# Patient Record
Sex: FEMALE | Race: WHITE | NOT HISPANIC OR LATINO | Employment: OTHER | URBAN - METROPOLITAN AREA
[De-identification: names, ages, dates, MRNs, and addresses within clinical notes are randomized per-mention and may not be internally consistent; named-entity substitution may affect disease eponyms.]

---

## 2019-02-18 ENCOUNTER — HOSPITAL ENCOUNTER (OUTPATIENT)
Dept: OTHER | Facility: HOSPITAL | Age: 49
Discharge: HOME | End: 2019-02-18
Attending: NURSE PRACTITIONER

## 2019-02-26 ENCOUNTER — HOSPITAL ENCOUNTER (OUTPATIENT)
Dept: OTHER | Facility: HOSPITAL | Age: 49
Discharge: HOME | End: 2019-02-26
Attending: NURSE PRACTITIONER

## 2019-03-27 ENCOUNTER — HOSPITAL ENCOUNTER (OUTPATIENT)
Dept: OTHER | Facility: HOSPITAL | Age: 49
Discharge: HOME | End: 2019-03-27
Attending: NURSE PRACTITIONER

## 2019-03-27 LAB
ALBUMIN SERPL-MCNC: 4.3 G/DL (ref 3.5–5)
ALBUMIN/GLOB SERPL: 1.4 {RATIO} (ref 1.4–2.6)
ALP SERPL-CCNC: 80 U/L (ref 42–98)
ALT SERPL-CCNC: 15 U/L (ref 10–40)
ANION GAP SERPL CALC-SCNC: 17 MMOL/L (ref 8–19)
AST SERPL-CCNC: 15 U/L (ref 15–50)
BASOPHILS # BLD AUTO: 0.06 10*3/UL (ref 0–0.2)
BASOPHILS NFR BLD AUTO: 1 % (ref 0–3)
BILIRUB SERPL-MCNC: 0.23 MG/DL (ref 0.2–1.3)
BUN SERPL-MCNC: 13 MG/DL (ref 5–25)
BUN/CREAT SERPL: 17 {RATIO} (ref 6–20)
CALCIUM SERPL-MCNC: 9.1 MG/DL (ref 8.7–10.4)
CHLORIDE SERPL-SCNC: 102 MMOL/L (ref 99–111)
CHOLEST SERPL-MCNC: 135 MG/DL (ref 107–200)
CHOLEST/HDLC SERPL: 2.5 {RATIO} (ref 3–6)
CONV ABS IMM GRAN: 0.01 10*3/UL (ref 0–0.2)
CONV CO2: 25 MMOL/L (ref 22–32)
CONV IMMATURE GRAN: 0.2 % (ref 0–1.8)
CONV TOTAL PROTEIN: 7.4 G/DL (ref 6.3–8.2)
CREAT UR-MCNC: 0.77 MG/DL (ref 0.5–0.9)
CRP SERPL-MCNC: 2 MG/L (ref 0–5)
DEPRECATED RDW RBC AUTO: 45.1 FL (ref 36.4–46.3)
EOSINOPHIL # BLD AUTO: 0.14 10*3/UL (ref 0–0.7)
EOSINOPHIL # BLD AUTO: 2.3 % (ref 0–7)
ERYTHROCYTE [DISTWIDTH] IN BLOOD BY AUTOMATED COUNT: 13.8 % (ref 11.7–14.4)
ERYTHROCYTE [SEDIMENTATION RATE] IN BLOOD: 3 MM/H (ref 0–20)
EST. AVERAGE GLUCOSE BLD GHB EST-MCNC: 100 MG/DL
FSH SERPL-ACNC: 10.3 M[IU]/ML
GFR SERPLBLD BASED ON 1.73 SQ M-ARVRAT: >60 ML/MIN/{1.73_M2}
GLOBULIN UR ELPH-MCNC: 3.1 G/DL (ref 2–3.5)
GLUCOSE SERPL-MCNC: 90 MG/DL (ref 65–99)
HBA1C MFR BLD: 13.5 G/DL (ref 12–16)
HBA1C MFR BLD: 5.1 % (ref 3.5–5.7)
HCT VFR BLD AUTO: 42.8 % (ref 37–47)
HDLC SERPL-MCNC: 55 MG/DL (ref 40–60)
LDLC SERPL CALC-MCNC: 58 MG/DL (ref 70–100)
LH SERPL-ACNC: 5.6 M[IU]/ML
LYMPHOCYTES # BLD AUTO: 1.72 10*3/UL (ref 1–5)
MCH RBC QN AUTO: 28 PG (ref 27–31)
MCHC RBC AUTO-ENTMCNC: 31.5 G/DL (ref 33–37)
MCV RBC AUTO: 88.8 FL (ref 81–99)
MONOCYTES # BLD AUTO: 0.52 10*3/UL (ref 0.2–1.2)
MONOCYTES NFR BLD AUTO: 8.7 % (ref 3–10)
NEUTROPHILS # BLD AUTO: 3.54 10*3/UL (ref 2–8)
NEUTROPHILS NFR BLD AUTO: 59.1 % (ref 30–85)
NRBC CBCN: 0 % (ref 0–0.7)
OSMOLALITY SERPL CALC.SUM OF ELEC: 290 MOSM/KG (ref 273–304)
PLATELET # BLD AUTO: 270 10*3/UL (ref 130–400)
PMV BLD AUTO: 10.6 FL (ref 9.4–12.3)
POTASSIUM SERPL-SCNC: 4.1 MMOL/L (ref 3.5–5.3)
RBC # BLD AUTO: 4.82 10*6/UL (ref 4.2–5.4)
SODIUM SERPL-SCNC: 140 MMOL/L (ref 135–147)
T4 FREE SERPL-MCNC: 1.2 NG/DL (ref 0.9–1.8)
TRIGL SERPL-MCNC: 108 MG/DL (ref 40–150)
TSH SERPL-ACNC: 2.05 M[IU]/L (ref 0.27–4.2)
VARIANT LYMPHS NFR BLD MANUAL: 28.7 % (ref 20–45)
VIT B12 SERPL-MCNC: 658 PG/ML (ref 211–911)
VLDLC SERPL-MCNC: 22 MG/DL (ref 5–37)
WBC # BLD AUTO: 5.99 10*3/UL (ref 4.8–10.8)

## 2019-03-28 LAB
25(OH)D3 SERPL-MCNC: 31.7 NG/ML (ref 30–100)
FOLATE SERPL-MCNC: 4.7 NG/ML (ref 4.8–20)

## 2019-08-30 ENCOUNTER — HOSPITAL ENCOUNTER (OUTPATIENT)
Dept: OTHER | Facility: HOSPITAL | Age: 49
Discharge: HOME | End: 2019-08-30
Attending: NURSE PRACTITIONER

## 2019-09-27 ENCOUNTER — TELEPHONE (OUTPATIENT)
Dept: SURGERY | Facility: CLINIC | Age: 49
End: 2019-09-27

## 2019-09-27 NOTE — TELEPHONE ENCOUNTER
New patient appointment with Dr. Gasca is scheduled on 10/7/2019 @ 10:00am.    Called and spoke to patient, patient expressed verbal understanding of appointment time.    Sent patient a reminder letter in the mail.

## 2019-10-07 ENCOUNTER — OFFICE VISIT (OUTPATIENT)
Dept: SURGERY | Facility: CLINIC | Age: 49
End: 2019-10-07

## 2019-10-07 VITALS
HEIGHT: 64 IN | DIASTOLIC BLOOD PRESSURE: 78 MMHG | HEART RATE: 88 BPM | OXYGEN SATURATION: 98 % | SYSTOLIC BLOOD PRESSURE: 102 MMHG | BODY MASS INDEX: 29.37 KG/M2 | WEIGHT: 172 LBS

## 2019-10-07 DIAGNOSIS — D24.2 BREAST FIBROADENOMA IN FEMALE, LEFT: Primary | ICD-10-CM

## 2019-10-07 PROCEDURE — 99205 OFFICE O/P NEW HI 60 MIN: CPT | Performed by: SURGERY

## 2019-10-07 RX ORDER — TIZANIDINE 4 MG/1
4 TABLET ORAL NIGHTLY
Refills: 0 | COMMUNITY
Start: 2019-10-01

## 2019-10-07 RX ORDER — HYDROXYCHLOROQUINE SULFATE 200 MG/1
200 TABLET, FILM COATED ORAL DAILY
COMMUNITY
Start: 2017-03-07 | End: 2020-09-02

## 2019-10-07 RX ORDER — SERTRALINE HYDROCHLORIDE 100 MG/1
150 TABLET, FILM COATED ORAL DAILY
COMMUNITY
Start: 2017-02-03 | End: 2020-05-28

## 2019-10-07 RX ORDER — PHENTERMINE HYDROCHLORIDE 37.5 MG/1
37.5 TABLET ORAL DAILY
Refills: 0 | COMMUNITY
Start: 2019-09-09 | End: 2020-05-28

## 2019-10-07 RX ORDER — PROPRANOLOL HYDROCHLORIDE 80 MG/1
CAPSULE, EXTENDED RELEASE ORAL
COMMUNITY
Start: 2017-06-20 | End: 2020-05-28

## 2019-10-07 NOTE — PROGRESS NOTES
[facility]     Referring Provider: FAB Ryan     Chief complaint: Left breast fibroadenoma     HPI: Ms. Sarika Nugent is a 49 yo woman, seen at the request of FAB Ryan, for a new diagnosis of left breast fibroadenoma. This was initially detected as an imaging abnormality on routine screening on 19. She underwent diagnostic imaging, however there was no ultrasound correlate for the 2.9 cm mass seen on mammogram. She subsequently underwent a stereotactic biopsy, which showed a fibroadenoma (I sent her biopsy pathology to Dr. Feng Rabago for a second opinion). Her work-up is detailed in the breast history section below. Prior to the biopsy, she denies any breast lumps, pain, skin changes, or nipple discharge.    She has a past history of a left breast ultrasound-guided biopsy in  which showed fibroadenomatoid change. She has a family history of breast cancer in her maternal grandmother (diagnosed in her 50s). She denies any family history of ovarian cancer. She was by herself in clinic today.       I personally reviewed her records and summarized her relevant breast history/imagin/6/13, Screening MMG ([place]):   Scattered fibroglandular densities. There is a round mass seen in the anterior depth region of the left breast at 3:00. There are no masses, suspicious calcifications, or other abnormalities seen in the right breast.   BI-RADS 0: Incomplete.    13, Left Diagnostic MMG & US ([facility]):   MMG:  An oval mass persisted on spot compression in the anterior depth lateral left breast.   US:  At the 3:00 position 3 cm from the nipple a 1.4 cm maximal diameter oval mildly heterogeneously hypoechoic parallel mass was identified which has benign imaging characteristics and likely represents a fibroadenoma. The mass is retrospectively palpable on targeted physical exam performed by myself. The finding correlates nicely with the mammogram. The patient prefers ultrasound guided  core needle biopsy.   BI-RADS 4A: Suspicious.    9/26/13, Left Breast, US-Guided Biopsy ([place]):   Left Breast Mass at 3:00, Core Needle Biopsy:   1. Benign Breast Tissue with Fibroadenomatoid Change, Apocrine Metaplasia, and Stromal Fibrosis.   2. No Atypia or Malignancy Identified.  -Concordant.    1/5/15, Screening MMG ([facility]):   Scattered fibroglandular densities. There is a biopsy marker associated with a faint nodular density in the upper outer hemisphere left breast, middle third depth. There is no new dominant nodule, mass or suspicious cluster of microcalcifications.  BI-RADS 2: Benign.    4/20/16, Screening MMG ([facility]):   Biopsy marker associated with a biopsy-proven fibroadenoma in the left breast is again noted. Breast parenchyma is composed of scattered fibroglandular densities. The pattern is unchanged. There is no new dominant nodule, mass, or suspicious cluster of microcalcifications.   BI-RADS 2: Benign.    8/30/19, Screening MMG with Tomosynthesis ([facility]):  Scattered areas of fibroglandular density.  No suspicious mass, area of architectural distortion or suspicious microcalcification is identified in the right breast. Stable 1 cm isodense nodule in the anterior outer left breast with associated biopsy clip. New or enlarged 2.5 cm isodense nodule in the mid depth slightly outer left breast. No concerning microcalcification or areas of architectural distortion in the left breast.   BI-RADS 0: Incomplete.     9/6/19, Left Diagnostic MMG & Left Breast US ([facility]):   MMG:  There is a well-circumscribed round, partially obscured mass in the superior left breast at the 12:00-1:00 position. This measures 2.9 x 2.4 x 2.7 cm. There no microcalcifications or architectural distortion. The lesion is positioned approximately 7 cm posterior to the nipple. Is been previous biopsy in the lateral left breast.   US:  No suspicious ultrasound findings are identified. No mass  or evidence of malignancy. An ultrasound correlates for the rounded mass in the left breast is not clearly identified.   IMPRESSION:   Incomplete mammogram. There is a 2.7 cm well-circumscribed mass in the left breast, presumably at the 12:00 to 1:00 position. It is unusual that no ultrasound correlates is identified. I would recommend a 3-D mammogram to confirm that the mass is truly present in this is not artifact from overlapping fibroglandular tissue. If this remains on the 3-D images, a stereotactic biopsy would be recommended.   BI-RADS 0: Incomplete.    9/10/19, Left Breast, Stereotactic Biopsy ([facility]):   1. Left Breast Tissue, Core Biopsies  Segments of Breast Tissue Containing Focal Sclerosing Adenosis, Very Focal Hyperplasia, Usual Type, and Segments of a Fibroglandular Lesion with Prominent Fibrosis.   Comment: The fibrous component has a locally prominent spindled/ fascicular pattern. Different diagnosis would include a fibroadenoma, as well as a myoepithelioma.   -Concordant.  Dr. Feng Rabago, Pathology Consultation ([facility]):   Breast, Left Core Needle Biopsy (SP-, 3 Slides):   - Fibroadenoma; Usual Hyperplasia without Atypia.  Comment: I essentially agree with the original pathologist's diagnosis. The fibroadenoma has myofibroblastic stroma, a finding of no clinical significance.      Review of Systems   Constitutional: Negative for appetite change, chills, diaphoresis, fatigue, fever and unexpected weight change.   HENT:   Negative for hearing loss, lump/mass, mouth sores, nosebleeds, sore throat, tinnitus, trouble swallowing and voice change.    Eyes: Negative for eye problems and icterus.   Respiratory: Negative for chest tightness, cough, hemoptysis, shortness of breath and wheezing.    Cardiovascular: Negative for chest pain, leg swelling and palpitations.   Gastrointestinal: Negative for abdominal distention, abdominal pain, blood in stool, constipation,  diarrhea, nausea, rectal pain and vomiting.   Endocrine: Negative for hot flashes.   Genitourinary: Negative for bladder incontinence, difficulty urinating, dyspareunia, dysuria, frequency, hematuria, menstrual problem, nocturia, pelvic pain, vaginal bleeding and vaginal discharge.    Musculoskeletal: Positive for arthralgias. Negative for back pain, flank pain, gait problem, myalgias, neck pain and neck stiffness.        Due to Lupus.   Skin: Negative for itching, rash and wound.   Neurological: Negative for dizziness, extremity weakness, gait problem, headaches, light-headedness, numbness, seizures and speech difficulty.   Hematological: Negative for adenopathy. Does not bruise/bleed easily.   Psychiatric/Behavioral: Positive for depression and sleep disturbance. Negative for confusion, decreased concentration and suicidal ideas. The patient is nervous/anxious.         Chronic, stable on medication.   I personally reviewed and updated the ROS.      Medications:    Current Outpatient Medications:   •  hydroxychloroquine (PLAQUENIL) 200 MG tablet, Take 200 mg by mouth Daily., Disp: , Rfl:   •  propranolol LA (INDERAL LA) 80 MG 24 hr capsule, , Disp: , Rfl:   •  sertraline (ZOLOFT) 100 MG tablet, Take 150 mg by mouth Daily., Disp: , Rfl:   •  topiramate (TOPAMAX) 200 MG tablet, , Disp: , Rfl:   •  linaclotide (LINZESS) 145 MCG capsule capsule, Take 145 mcg by mouth Daily., Disp: , Rfl:   •  Multiple Vitamins-Minerals (MULTIVITAL) tablet, Take 1 tablet by mouth Daily., Disp: , Rfl:   •  phentermine (ADIPEX-P) 37.5 MG tablet, Take 37.5 mg by mouth Daily., Disp: , Rfl: 0  •  tiZANidine (ZANAFLEX) 4 MG tablet, Take 4 mg by mouth 3 (Three) Times a Day As Needed., Disp: , Rfl: 0      Allergies   Allergen Reactions   • Clindamycin Rash and Swelling   • Erythromycin Other (See Comments) and Rash     INTERNAL Bleeding   • Levofloxacin Rash and Swelling   • Penicillins Rash and Swelling     PT REPORTS CAN TAKE KEFLEX/KEFZOL  WITHOUT ANY ISSUES.   • Sulfamethoxazole-Trimethoprim Rash and Swelling       Past Medical History:   Diagnosis Date   • Lupus (CMS/HCC)        Past Surgical History:   Procedure Laterality Date   • KNEE SURGERY     • RHINOPLASTY     • TUBAL ABDOMINAL LIGATION     • WRIST FRACTURE SURGERY         Family History   Problem Relation Age of Onset   • Breast cancer Maternal Grandmother         in her 50's   • Brain cancer Maternal Aunt         in her 50's   • Cancer Maternal Cousin         unsure of what kind in her late 40's   • Cervical cancer Maternal Aunt         late 30's/early 40's       Social History     Socioeconomic History   • Marital status:      Spouse name: Not on file   • Number of children: 4   • Years of education: Not on file   • Highest education level: Not on file   Occupational History   • Occupation: Disabled   Tobacco Use   • Smoking status: Never Smoker   • Smokeless tobacco: Never Used   Substance and Sexual Activity   • Alcohol use: No     Frequency: Never   • Drug use: No   Social History Narrative    1 daughter, 3 sons     Patient drinks 1 servings of caffeine per day.       GYNECOLOGIC HISTORY:   . P: 4. AB: 0.  Last menstrual period: 21  Age at menarche: 16  Age at first childbirth: 20  Lactation/How long: no  Age at menopause: premenopausal  Total years of oral contraceptive use: none  Total years of hormone replacement therapy: none      Physical Exam  Vitals:    10/07/19 1008   BP: 102/78   Pulse: 88   SpO2: 98%     ECOG 0 - Asymptomatic  General: NAD, well appearing  Psych: a&o x 3, normal mood and affect  Eyes: EOMI, no scleral icterus  ENMT: neck supple without masses or thyromegaly, mucus membranes moist  Resp: normal effort, CTAB  CV: RRR, no murmurs, no edema  GI: soft, NT, ND  MSK: normal gait, normal ROM in bilateral shoulders  Lymph nodes: no cervical, supraclavicular or axillary lymphadenopathy  Breast: symmetric, large size, grade 1 ptosis, dense tissue  "bilaterally  Right: No visible abnormalities on inspection while seated, with arms raised or hands on hips. No masses, skin changes, or nipple abnormalities.  Left: No visible abnormalities on inspection while seated, with arms raised or hands on hips. No masses, skin changes, or nipple abnormalities.    Left breast, in-office ultrasound: At 2:00, partially retroareolar, there is an oval, well-circumscribed, hypoechoic mass with internal biopsy clip visualized. This is surrounded by dense stromal tissue, which is probably why it is not easily palpable.       I have independently reviewed her imaging and here are my findings:   In the left retroareolar breast, there is a ~3 cm well-circumscribed mass with an \"hourglass-shaped\" clip, consistent with the biopsy-proven fibroadenoma. There is an \"open lock-shaped\" clip in the left breast at 3:00, from the prior benign biopsy in 2013.      Assessment:  48 y.o. F with a new diagnosis of left breast fibroadenoma.    Discussion:  We discussed the diagnosis of fibroadenoma. I reviewed its benign nature and usually benign natural history, often regressing with time. I reviewed with her the low risk of malignant degeneration. I reviewed indications for excision including symptoms (such as pain or cosmesis), large size at baseline, or interval increase in size on imaging or exam. She currently has no indications for excision, however if the mass enlarges, we may consider excision.    I do however recommend short interval follow-up imaging to document stability of the lesion. In addition I recommend a repeat ultrasound, since the mass was not initially identified on the diagnostic ultrasound done at [facility] and I do believe that there is an ultrasound correlate.    Plan:  -F/u in 3/2020 with left MMG & US.   -She was instructed to call sooner with any questions, concerns or changes on BSE.    Letty Gasca MD      CC:  FAB Ryan  "

## 2019-10-14 ENCOUNTER — TELEPHONE (OUTPATIENT)
Dept: SURGERY | Facility: CLINIC | Age: 49
End: 2019-10-14

## 2019-10-14 NOTE — TELEPHONE ENCOUNTER
MMG & US are scheduled on 3/9/2020 @ 12:00pm.    F/u appointment with Dr. Gasca is scheduled on 3/18/2020 @ 10:00am.    Called and spoke to patient, patient expressed verbal understanding of appointment time.    Sent patient a reminder letter in the mail.

## 2020-03-02 ENCOUNTER — HOSPITAL ENCOUNTER (OUTPATIENT)
Dept: GENERAL RADIOLOGY | Facility: HOSPITAL | Age: 50
Discharge: HOME | End: 2020-03-02
Attending: NURSE PRACTITIONER

## 2020-03-09 ENCOUNTER — HOSPITAL ENCOUNTER (OUTPATIENT)
Dept: MAMMOGRAPHY | Facility: HOSPITAL | Age: 50
Discharge: HOME | End: 2020-03-09

## 2020-03-16 ENCOUNTER — TELEPHONE (OUTPATIENT)
Dept: SURGERY | Facility: CLINIC | Age: 50
End: 2020-03-16

## 2020-03-16 NOTE — TELEPHONE ENCOUNTER
Due to COVID-19 epidemic, patient's appointment with Dr. Gasca on 3/18/2020 has been cancelled.    Called and spoke to patient, patient expressed verbal understanding, will call her back at a later date to reschedule.

## 2020-04-02 ENCOUNTER — TRANSCRIBE ORDERS (OUTPATIENT)
Dept: SURGERY | Facility: CLINIC | Age: 50
End: 2020-04-02

## 2020-04-02 DIAGNOSIS — Z12.31 ENCOUNTER FOR SCREENING MAMMOGRAM FOR BREAST CANCER: Primary | ICD-10-CM

## 2020-04-02 DIAGNOSIS — D24.2 BENIGN NEOPLASM OF LEFT BREAST: ICD-10-CM

## 2020-04-09 ENCOUNTER — TELEPHONE (OUTPATIENT)
Dept: SURGERY | Facility: CLINIC | Age: 50
End: 2020-04-09

## 2020-04-09 NOTE — TELEPHONE ENCOUNTER
MMG & US are scheduled @ [facility] on 9/1/20 @ 2:00pm.    F/u appointment with Dr. Gasca is scheduled on 9/9/20 @ 22:30pm.    Called and spoke to patient, patient expressed verbal understanding of appointment times.    Sent patient a reminder letter in the mail.

## 2020-04-14 ENCOUNTER — HOSPITAL ENCOUNTER (OUTPATIENT)
Dept: OTHER | Facility: HOSPITAL | Age: 50
Discharge: HOME | End: 2020-04-14
Attending: NURSE PRACTITIONER

## 2020-04-17 ENCOUNTER — TELEPHONE (OUTPATIENT)
Dept: ORTHOPEDIC SURGERY | Facility: CLINIC | Age: 50
End: 2020-04-17

## 2020-04-17 NOTE — TELEPHONE ENCOUNTER
Leydi means/PCP Dr. Riddhi Lucero's office called to refer patient for IOV / LEFT Foot Peroneus Brevis Tendon Tear & LEFT Knee / NKI / pain since 02/20/20 / MRI 4/14/20 (Report & Ofc. Notes to be faxed) / had LEFT Knee SX 2015     Will MWM see next in office Mon 4/27 for IOV / LEFT Foot Tendon tear? Thanks /srh

## 2020-04-17 NOTE — TELEPHONE ENCOUNTER
I can see at 11 on Monday, but pt needs to bring in discs of xrays and MRI and make sure that we have the reports before I see her thanks

## 2020-04-17 NOTE — TELEPHONE ENCOUNTER
Attempted to call Leydi at Dr. Riddhi Lucero's office. Automated message stated office is Closed for today 4/17. Left message for Leydi to call [facility] / [facility].     If / when Leydi calls, can transfer to [facility] or inform Leydi *per verbal conversation with DILAN* - MWM can see next in office Mon 4/27 AM however MWM needs reports faxed to [facility] *before* appt. Also, patient needs to bring discs of XR / MRI done.

## 2020-04-20 NOTE — TELEPHONE ENCOUNTER
Leydi from Dr. Lucero's office returned missed calls. Notified Lashon that MWM can see patient next Mon 4/27 - informed Leydi to have patient arrive by 0800 for 0820 appt.     Scheduled patient to see MWM for IOV / LEFT Foot Peroneus Brevis Tendon Tear & LEFT Knee / NKI / pain since 02/20/20 / MRI 4/14/20 (Report & Ofc. Notes in [employer]) / had LEFT Knee SX 2015. Leydi given [place] address & verbalized understanding.

## 2020-04-20 NOTE — TELEPHONE ENCOUNTER
Attempted to call Leydi at Dr. Riddhi Lucero's office. Automated message stated office is Closed for today 4/17. Left message for Leydi to call H[facility]IMENE[facility] / [facility].      If / when Leydi calls, can transfer to [place] or inform Leydi *per verbal conversation with DILAN* - MWM can see next in office Mon 4/27 AM & that patient needs to bring discs of XR / MRI done. (Dr. Lucero's notes / reports in Epic). Thanks /srh

## 2020-04-27 ENCOUNTER — OFFICE VISIT (OUTPATIENT)
Dept: ORTHOPEDIC SURGERY | Facility: CLINIC | Age: 50
End: 2020-04-27

## 2020-04-27 VITALS — HEIGHT: 63 IN | WEIGHT: 174 LBS | BODY MASS INDEX: 30.83 KG/M2 | TEMPERATURE: 97.5 F

## 2020-04-27 DIAGNOSIS — M25.562 ACUTE PAIN OF LEFT KNEE: Primary | ICD-10-CM

## 2020-04-27 DIAGNOSIS — S83.242A TEAR OF MEDIAL MENISCUS OF LEFT KNEE, CURRENT, UNSPECIFIED TEAR TYPE, INITIAL ENCOUNTER: ICD-10-CM

## 2020-04-27 DIAGNOSIS — M25.572 ACUTE LEFT ANKLE PAIN: ICD-10-CM

## 2020-04-27 DIAGNOSIS — S83.241A TEAR OF MEDIAL MENISCUS OF RIGHT KNEE, CURRENT, UNSPECIFIED TEAR TYPE, INITIAL ENCOUNTER: ICD-10-CM

## 2020-04-27 DIAGNOSIS — R20.0 NUMBNESS IN LEFT LEG: ICD-10-CM

## 2020-04-27 DIAGNOSIS — M25.561 ACUTE PAIN OF RIGHT KNEE: ICD-10-CM

## 2020-04-27 DIAGNOSIS — M79.672 LEFT FOOT PAIN: ICD-10-CM

## 2020-04-27 DIAGNOSIS — S86.312A PERONEAL TENDON TEAR, LEFT, INITIAL ENCOUNTER: ICD-10-CM

## 2020-04-27 PROCEDURE — 99204 OFFICE O/P NEW MOD 45 MIN: CPT | Performed by: ORTHOPAEDIC SURGERY

## 2020-04-27 PROCEDURE — 73630 X-RAY EXAM OF FOOT: CPT | Performed by: ORTHOPAEDIC SURGERY

## 2020-04-27 PROCEDURE — 73562 X-RAY EXAM OF KNEE 3: CPT | Performed by: ORTHOPAEDIC SURGERY

## 2020-04-27 PROCEDURE — 73610 X-RAY EXAM OF ANKLE: CPT | Performed by: ORTHOPAEDIC SURGERY

## 2020-04-27 RX ORDER — BUPROPION HYDROCHLORIDE 150 MG/1
150 TABLET ORAL EVERY MORNING
COMMUNITY
Start: 2020-04-23

## 2020-04-27 RX ORDER — TRAZODONE HYDROCHLORIDE 50 MG/1
50 TABLET ORAL
COMMUNITY
Start: 2020-04-10 | End: 2020-07-13

## 2020-04-27 RX ORDER — PRAMIPEXOLE DIHYDROCHLORIDE 0.75 MG/1
TABLET ORAL
COMMUNITY
Start: 2020-04-16 | End: 2020-05-28

## 2020-04-27 NOTE — PROGRESS NOTES
"   New Patient Complaint      Patient: Sarika Nugent  YOB: 1970 49 y.o. female  Medical Record Number: [id number]    Chief Complaints: My knees of my left leg and ankle hurt    History of Present Illness: Patient reports a greater than 3-month history of pain in her left ankle after prolonged walking and standing.  She does not recall any specific injury other than falling down some steps about 10 years ago and has had problems with her ankle intermittently since then.    She also complains of several year history of bilateral moderate intermittent aching pain with feelings of catching and locking in both knees right greater than left symptoms are worse with walking.    She is had a history of bilateral knee scopes done on the left in June 2017 and right in October 2017 by Dr. Fall at [facility].    She reports pain in her left leg that goes down her leg with numbness in the lateral aspect of her left ankle and foot and describes what sounds like somewhat of a Tinel's over the lateral aspect of the ankle over the superficial peroneal nerve and has had a history of \"back problems\" and has been followed by the [facility] in the past for this.    She also tells me that she was diagnosed several years ago with lupus but does not have a current rheumatologist.  Evidently she was diagnosed in a town but has not followed up with her rheumatologist as she \"has not found a good one\".  She is discussing referral to another one with her primary care provider FAB Chandler who is requested my opinion ready etiology and treatment of the conditions with her left ankle and knees.    She had been sent prior to this visit for MRI of the left ankle and left knee.        HPI    Allergies:   Allergies   Allergen Reactions   • Clindamycin Rash and Swelling   • Erythromycin Other (See Comments) and Rash     INTERNAL Bleeding   • Levofloxacin Rash and Swelling   • Penicillins Rash and Swelling     PT REPORTS " CAN TAKE KEFLEX/KEFZOL WITHOUT ANY ISSUES.   • Sulfamethoxazole-Trimethoprim Rash and Swelling       Medications:   Current Outpatient Medications on File Prior to Visit   Medication Sig   • buPROPion XL (WELLBUTRIN XL) 150 MG 24 hr tablet    • hydroxychloroquine (PLAQUENIL) 200 MG tablet Take 200 mg by mouth Daily.   • linaclotide (LINZESS) 145 MCG capsule capsule Take 145 mcg by mouth Daily.   • Multiple Vitamins-Minerals (MULTIVITAL) tablet Take 1 tablet by mouth Daily.   • phentermine (ADIPEX-P) 37.5 MG tablet Take 37.5 mg by mouth Daily.   • pramipexole (MIRAPEX) 0.75 MG tablet    • propranolol LA (INDERAL LA) 80 MG 24 hr capsule    • sertraline (ZOLOFT) 100 MG tablet Take 150 mg by mouth Daily.   • tiZANidine (ZANAFLEX) 4 MG tablet Take 4 mg by mouth 3 (Three) Times a Day As Needed.   • topiramate (TOPAMAX) 200 MG tablet    • traZODone (DESYREL) 50 MG tablet Take 50 mg by mouth every night at bedtime.     No current facility-administered medications on file prior to visit.        Past Medical History:   Diagnosis Date   • Lupus (CMS/HCC)      Past Surgical History:   Procedure Laterality Date   • KNEE SURGERY     • RHINOPLASTY     • TUBAL ABDOMINAL LIGATION     • WRIST FRACTURE SURGERY       Social History     Occupational History   • Occupation: Disabled   Tobacco Use   • Smoking status: Never Smoker   • Smokeless tobacco: Never Used   Substance and Sexual Activity   • Alcohol use: No     Frequency: Never   • Drug use: No   • Sexual activity: Not on file      Social History     Social History Narrative    1 daughter, 3 sons     Family History   Problem Relation Age of Onset   • Breast cancer Maternal Grandmother         in her 50's   • Brain cancer Maternal Aunt         in her 50's   • Cancer Maternal Cousin         unsure of what kind in her late 40's   • Cervical cancer Maternal Aunt         late 30's/early 40's       Review of Systems: 14 point review of systems performed, positive pertinent findings  "identified in HPI. All remaining systems negative except activity change, fatigue, sleep disorder, leg swelling, constipation, joint pain, back pain, muscle aches, environmental allergies, headaches, numbness    Review of Systems      Physical Exam:   Vitals:    04/27/20 0912   Temp: 97.5 °F (36.4 °C)   TempSrc: Temporal   Weight: 78.9 kg (174 lb)   Height: 160 cm (63\")   PainSc:   5     Physical Exam   Constitutional: pleasant, well developed   Eyes: sclera non icteric  Hearing : adequate for exam  Cardiovascular: palpable pulses in left foot, left calf/ thigh NT without sign of DVT  Respiratoy: breathing unlabored   Neurological: grossly sensate to LT throughout left LE  Psychiatric: oriented with normal mood and affect.   Lymphatic: No palpable popliteal lymphadenopathy left LE  Skin: intact throughout left leg/foot  Musculoskeletal: She is ambulating without assistive device.  Both knees showed no warmth erythema and trace effusion.  She is able to actively fully extend both knees and flex about 110 to 115 degrees with well-healed portals.  There is moderate discomfort over the medial more so than lateral joint line bilaterally with Matt's and grossly stable ligamentous exams.    Left ankle shows somewhat of a Tinel's over the superficial peroneal nerve but today was grossly intact sensation over the lateral aspect of the ankle with no mottling or hyperesthesia and really no focal pain today along the peroneal tendons to palpation or with resisted eversion and grossly stable anterior drawer.  Physical Exam  Ortho Exam    Radiology: 3 views of both knees ordered evaluate pain and alignment reviewed and no prior x-rays available for comparison.  There is some mild joint space narrowing bilaterally but overall joint space appears relatively well-maintained without any gross malalignment or lateral tilt of the patella.  3 views left ankle and 3 views left foot ordered evaluate pain and alignment reviewed and no " prior x-rays available for comparison there is relative elongation of the second and third metatarsal but no obvious fracture there is some mild arthritic change to the midfoot some mild spurring at the insertion of the Achilles and plantar heel talus appears well seated in the mortise with no obvious fracture or malalignment noted about the ankle joint.    MRI films and report of the left ankle dated 4/14/2020 from [facility] shows a longitudinal split thickness tear of the peroneus brevis tendon posterior to the lateral malleolus as well as some findings consistent with plantar fasciitis with minimal subcortical marrow edema the posterior plantar hindfoot at the attachment of the plantar fascia with a small plantar calcaneal spur.  Otherwise ankle MRI was unremarkable.    MRI films and report of the left knee dated 4/14/2020 from [facility] were reviewed which shows an oblique horizontal signal at the posterior body and posterior horn the medial meniscus extending to the articular surface with some small foci of fluid at the periphery suspected to be small para meniscal cyst and possible small radial free edge defect of the mid body of the medial meniscus but without definitive fluid signal within the meniscus or definite displaced meniscal flap or fragment.    There is a trace amount of fluid within a Baker's cyst and some scarring of Hoffa's fat pad from previous arthroscopy no focal cartilage deficits noted.    Assessment/Plan: 1.  Left ankle peroneus brevis tear  2.  Left ankle possible neuritis of the superficial peroneal nerve  3.  Left lower extremity possible radicular pain  4.  Left knee possible recurrent medial meniscal tear  5.  Right knee possible medial meniscal tear      We discussed treatment options at length and nothing I would do from an operative standpoint at this point nor does she desire especially given her lack of much pain over the peroneal tendons today on exam but said it does  bother her a lot after she is up and about does seem to be somewhat of a neuritic component to her pain as well with possible compression of the superficial peroneal nerve versus lumbar radiculopathy.    Right knee may have a recurrent meniscal tear if she is having complaints of symptoms there more so than on the left at this time.    If we do anything further we need to get an EMG nerve conduction study of her left lower extremity to see if there is anything coming from more proximal that may need to be addressed rather than at her ankle.    We also need to get an MRI of her right knee to evaluate for any meniscal tears in order to help tailor treatment accordingly.    A lot of her symptoms may be due to lupus and may need evaluation by rheumatologist prior to any further treatment and she is can speak with her primary care provider about this if not we could like a referral.    She understands to call to schedule follow-up appointment after MRI of the right knee and EMG nerve conduction study left lower extremity been scheduled in order review results in the office.    She is currently on disability for her lupus and multiple joint complaints.

## 2020-05-06 ENCOUNTER — HOSPITAL ENCOUNTER (OUTPATIENT)
Dept: MRI IMAGING | Facility: HOSPITAL | Age: 50
Discharge: HOME | End: 2020-05-06
Admitting: ORTHOPAEDIC SURGERY

## 2020-05-06 DIAGNOSIS — S83.241A TEAR OF MEDIAL MENISCUS OF RIGHT KNEE, CURRENT, UNSPECIFIED TEAR TYPE, INITIAL ENCOUNTER: ICD-10-CM

## 2020-05-06 PROCEDURE — 73721 MRI JNT OF LWR EXTRE W/O DYE: CPT

## 2020-05-11 ENCOUNTER — TELEPHONE (OUTPATIENT)
Dept: ORTHOPEDIC SURGERY | Facility: CLINIC | Age: 50
End: 2020-05-11

## 2020-05-11 NOTE — TELEPHONE ENCOUNTER
Have spoke with the patient regarding her MRI results.  She is scheduled to see Dr. Stewart next Monday, May 18 for follow-up.  She is having her EMG done today.  He will discuss the results of the EMG and then treatment options at the time of her office visit

## 2020-05-11 NOTE — TELEPHONE ENCOUNTER
----- Message from Tam Stewart MD sent at 5/8/2020  4:10 PM EDT -----  Please let her know that her most recent MRI of the right knee shows some progression of arthritis but also recurrent medial meniscal tear.  She needs to call and schedule an appointment for follow-up to review results but needs to wait until she has had her EMG nerve conduction study done as well prior to scheduling this appointment so that we have all these results to review with her.

## 2020-05-18 ENCOUNTER — OFFICE VISIT (OUTPATIENT)
Dept: ORTHOPEDIC SURGERY | Facility: CLINIC | Age: 50
End: 2020-05-18

## 2020-05-18 VITALS — WEIGHT: 172.8 LBS | BODY MASS INDEX: 29.5 KG/M2 | TEMPERATURE: 98.2 F | HEIGHT: 64 IN

## 2020-05-18 DIAGNOSIS — R20.0 NUMBNESS IN LEFT LEG: ICD-10-CM

## 2020-05-18 DIAGNOSIS — S83.241D TEAR OF MEDIAL MENISCUS OF RIGHT KNEE, CURRENT, UNSPECIFIED TEAR TYPE, SUBSEQUENT ENCOUNTER: Primary | ICD-10-CM

## 2020-05-18 DIAGNOSIS — S86.312D PERONEAL TENDON TEAR, LEFT, SUBSEQUENT ENCOUNTER: ICD-10-CM

## 2020-05-18 DIAGNOSIS — S83.242D TEAR OF MEDIAL MENISCUS OF LEFT KNEE, UNSPECIFIED TEAR TYPE, UNSPECIFIED WHETHER OLD OR CURRENT TEAR, SUBSEQUENT ENCOUNTER: ICD-10-CM

## 2020-05-18 PROBLEM — S83.241A TEAR OF MEDIAL MENISCUS OF RIGHT KNEE, CURRENT: Status: ACTIVE | Noted: 2020-05-18

## 2020-05-18 PROCEDURE — 99214 OFFICE O/P EST MOD 30 MIN: CPT | Performed by: ORTHOPAEDIC SURGERY

## 2020-05-18 RX ORDER — DOXYCYCLINE 100 MG/1
100 TABLET ORAL 2 TIMES DAILY
COMMUNITY
Start: 2020-05-14 | End: 2020-05-28

## 2020-05-18 RX ORDER — METRONIDAZOLE 500 MG/1
500 TABLET ORAL 2 TIMES DAILY
COMMUNITY
Start: 2020-05-14 | End: 2020-05-28

## 2020-05-18 RX ORDER — ERENUMAB-AOOE 70 MG/ML
70 INJECTION SUBCUTANEOUS
COMMUNITY
Start: 2020-05-06

## 2020-05-18 NOTE — PROGRESS NOTES
"Knee Exam      Patient: Sarika Nugent    YOB: 1970 49 y.o. female    Chief Complaints: knees and ankle hurt    History of Present Illness: Patient follows up for bilateral knee pain and left leg/ankle pain.  Please see note from 4/27/2020.  She had previous knee scopes done by Dr. Fall in the past and has complaints of recurrent bilateral knee pain now right greater than left with feelings of catching right much more so than left with mainly some burning pain in both knees in the posterior aspect of the left knee and inferolateral aspect of the left ankle with some occasional tingling on the lateral aspect of her ankle.    At that last visit she had reported some numbness in her left leg and lateral ankle and had a \"history of \"back problems\".  She tells me today she been followed at the [facility] for this but quit seeing them after they told her that they thought majority of her pain was due to her lupus.  She does not currently have a rheumatologist and is in the process of trying to get a new one through her primary care physician in my urging at her last visit.    She had previous MRI of her left knee and left ankle that was reviewed at our last visit and was sent for MRI of the right ankle as well as EMG nerve conduction study.    We will have the results of the EMG nerve conduction study but she said it was done at [facility] physical therapy and the results were sent to her regular doctor but we do not have those and she is working on getting them to us.  Today she denies any radicular symptoms as far shooting pain going down the leg but mainly has burning pain in the posterior aspect of the left knee and in the lateral aspect of the left ankle.  HPI    ROS: knee pain, tingling and ankle  Past Medical History:   Diagnosis Date   • Lupus (CMS/[facility])        Physical Exam:   Vitals:    05/18/20 1315   Temp: 98.2 °F (36.8 °C)   Weight: 78.4 kg (172 lb 12.8 oz)   Height: 161.3 cm (63.5\") "   PainSc:   2     Well developed with normal mood.  Examination of both knees shows no warmth erythema there is trace effusion.  Right knee shows moderate discomfort of the medial joint line exacerbated with Matt's.  There is 115 degrees range of motion with grossly stable ligamentous exam.    Left ankle shows discomfort along the peroneal tendon with slight Tinel's over the superficial peroneal nerve good eversion strength and grossly stable ligamentous exam with some guarding.  Is unable to elicit any radicular symptoms today with straight leg testing.          Radiology: MRI films and report of the right knee dated 5/6/2020 was reviewed which shows chondromalacia the patella and medial compartments that is been somewhat progressive since 2017 and recurrent medial meniscal tear with deformities of the meniscal body and radial defect near the posterior root.      Assessment/Plan:  1.  Left ankle peroneus brevis tear  2.  Left ankle possible neuritis of the superficial peroneal nerve  3.  Left lower extremity possible radicular pain  4.  Left knee possible recurrent medial meniscal tear  5.  Right knee medial meniscal tear and patellofemoral and medial compartment chondromalacia     We discussed treatment options at length and the main thing that is bothering her right now is her right knee.    I have discussed operative and non-operative treatment options and although no guarantees could be given,  pt would like to proceed with operative treatment. Plan is for knee scope with debridement as needed. We discussed the  procedure in detail including use of a model as well as  post op protocol. Risks, benefits, potential outcomes, and complications were reviewed and can include but are not limited to heart attack, stroke, death, pneumonia, infection, bleeding, damage to blood vessels, nerves, or tendons, blood clot, pulmonary embolus,persistent or worsening symptoms, stiffness, need for subsequent surgery, and  "failure to return to presurgery and precondition levels of activity.Pt voiced a clear understanding of these. This will be scheduled on an outpatient basis at a mutually convenient time. Pt was encouraged to call with any questions prior to surgery.      Regarding her left leg she is been seen by spine specialist in the past it does not sound like the work-up was fruitful and she is going to get her EMG nerve conduction study to me we will see anything further on it I do not know there is any further work-up to do regarding the neuritic symptoms in her left leg and she declined seeing a neurologist.  It may be that she is having some burning pain from the symptoms in her left knee and from the peroneal tendon pathology but were not going to address those from a surgical standpoint at this time.  We discussed injection to the left knee which she declined at this time as it was not a \"fix\" but told her it could alleviate symptoms but decided to hold off on it at this point.    At this point will hold off on do anything with her left leg syndrome proceed with arthroscopy of the right knee with debridement hopefully help with some of her mechanical symptoms and she does work on getting him with a rheumatologist.      "

## 2020-05-19 ENCOUNTER — TELEPHONE (OUTPATIENT)
Dept: ORTHOPEDIC SURGERY | Facility: CLINIC | Age: 50
End: 2020-05-19

## 2020-05-28 ENCOUNTER — TRANSCRIBE ORDERS (OUTPATIENT)
Dept: SLEEP MEDICINE | Facility: HOSPITAL | Age: 50
End: 2020-05-28

## 2020-05-28 ENCOUNTER — APPOINTMENT (OUTPATIENT)
Dept: PREADMISSION TESTING | Facility: HOSPITAL | Age: 50
End: 2020-05-28

## 2020-05-28 VITALS
BODY MASS INDEX: 29.37 KG/M2 | SYSTOLIC BLOOD PRESSURE: 105 MMHG | TEMPERATURE: 97.8 F | DIASTOLIC BLOOD PRESSURE: 72 MMHG | HEART RATE: 77 BPM | WEIGHT: 172 LBS | OXYGEN SATURATION: 100 % | HEIGHT: 64 IN | RESPIRATION RATE: 16 BRPM

## 2020-05-28 DIAGNOSIS — Z01.818 OTHER SPECIFIED PRE-OPERATIVE EXAMINATION: Primary | ICD-10-CM

## 2020-05-28 LAB
ANION GAP SERPL CALCULATED.3IONS-SCNC: 9.4 MMOL/L (ref 5–15)
BUN BLD-MCNC: 8 MG/DL (ref 6–20)
BUN/CREAT SERPL: 8.5 (ref 7–25)
CALCIUM SPEC-SCNC: 9.3 MG/DL (ref 8.6–10.5)
CHLORIDE SERPL-SCNC: 106 MMOL/L (ref 98–107)
CO2 SERPL-SCNC: 22.6 MMOL/L (ref 22–29)
CREAT BLD-MCNC: 0.94 MG/DL (ref 0.57–1)
DEPRECATED RDW RBC AUTO: 43.4 FL (ref 37–54)
ERYTHROCYTE [DISTWIDTH] IN BLOOD BY AUTOMATED COUNT: 13.7 % (ref 12.3–15.4)
GFR SERPL CREATININE-BSD FRML MDRD: 64 ML/MIN/1.73
GLUCOSE BLD-MCNC: 87 MG/DL (ref 65–99)
HCG SERPL QL: NEGATIVE
HCT VFR BLD AUTO: 40.8 % (ref 34–46.6)
HGB BLD-MCNC: 13.5 G/DL (ref 12–15.9)
MCH RBC QN AUTO: 28.5 PG (ref 26.6–33)
MCHC RBC AUTO-ENTMCNC: 33.1 G/DL (ref 31.5–35.7)
MCV RBC AUTO: 86.3 FL (ref 79–97)
PLATELET # BLD AUTO: 241 10*3/MM3 (ref 140–450)
PMV BLD AUTO: 10.1 FL (ref 6–12)
POTASSIUM BLD-SCNC: 3.7 MMOL/L (ref 3.5–5.2)
RBC # BLD AUTO: 4.73 10*6/MM3 (ref 3.77–5.28)
SODIUM BLD-SCNC: 138 MMOL/L (ref 136–145)
WBC NRBC COR # BLD: 7.07 10*3/MM3 (ref 3.4–10.8)

## 2020-05-28 PROCEDURE — 85027 COMPLETE CBC AUTOMATED: CPT | Performed by: ORTHOPAEDIC SURGERY

## 2020-05-28 PROCEDURE — 36415 COLL VENOUS BLD VENIPUNCTURE: CPT

## 2020-05-28 PROCEDURE — 84703 CHORIONIC GONADOTROPIN ASSAY: CPT | Performed by: ORTHOPAEDIC SURGERY

## 2020-05-28 PROCEDURE — 80048 BASIC METABOLIC PNL TOTAL CA: CPT | Performed by: ORTHOPAEDIC SURGERY

## 2020-05-30 ENCOUNTER — LAB (OUTPATIENT)
Dept: LAB | Facility: HOSPITAL | Age: 50
End: 2020-05-30

## 2020-05-30 DIAGNOSIS — Z01.818 OTHER SPECIFIED PRE-OPERATIVE EXAMINATION: ICD-10-CM

## 2020-05-30 PROCEDURE — U0004 COV-19 TEST NON-CDC HGH THRU: HCPCS

## 2020-06-01 ENCOUNTER — APPOINTMENT (OUTPATIENT)
Dept: PREADMISSION TESTING | Facility: HOSPITAL | Age: 50
End: 2020-06-01

## 2020-06-01 LAB
REF LAB TEST METHOD: NORMAL
SARS-COV-2 RNA RESP QL NAA+PROBE: NOT DETECTED

## 2020-06-01 NOTE — H&P
"Chief Complaints: knees and ankle hurt     History of Present Illness: Patient follows up for bilateral knee pain and left leg/ankle pain.  Please see note from 4/27/2020.  She had previous knee scopes done by Dr. Fall in the past and has complaints of recurrent bilateral knee pain now right greater than left with feelings of catching right much more so than left with mainly some burning pain in both knees in the posterior aspect of the left knee and inferolateral aspect of the left ankle with some occasional tingling on the lateral aspect of her ankle.     At that last visit she had reported some numbness in her left leg and lateral ankle and had a \"history of \"back problems\".  She tells me today she been followed at the [facility] for this but quit seeing them after they told her that they thought majority of her pain was due to her lupus.  She does not currently have a rheumatologist and is in the process of trying to get a new one through her primary care physician in my urging at her last visit.     She had previous MRI of her left knee and left ankle that was reviewed at our last visit and was sent for MRI of the right ankle as well as EMG nerve conduction study.     We will have the results of the EMG nerve conduction study but she said it was done at [facility] physical therapy and the results were sent to her regular doctor but we do not have those and she is working on getting them to us.  Today she denies any radicular symptoms as far shooting pain going down the leg but mainly has burning pain in the posterior aspect of the left knee and in the lateral aspect of the left ankle.  HPI     ROS: knee pain, tingling and ankle  Medical History        Past Medical History:   Diagnosis Date   • Lupus (CMS/[facility])              Physical Exam:   Vitals       Vitals:     05/18/20 1315   Temp: 98.2 °F (36.8 °C)   Weight: 78.4 kg (172 lb 12.8 oz)   Height: 161.3 cm (63.5\")   PainSc:   2         Well developed with " normal mood.  Examination of both knees shows no warmth erythema there is trace effusion.  Right knee shows moderate discomfort of the medial joint line exacerbated with Matt's.  There is 115 degrees range of motion with grossly stable ligamentous exam.     Left ankle shows discomfort along the peroneal tendon with slight Tinel's over the superficial peroneal nerve good eversion strength and grossly stable ligamentous exam with some guarding.  Is unable to elicit any radicular symptoms today with straight leg testing.              Radiology: MRI films and report of the right knee dated 5/6/2020 was reviewed which shows chondromalacia the patella and medial compartments that is been somewhat progressive since 2017 and recurrent medial meniscal tear with deformities of the meniscal body and radial defect near the posterior root.        Assessment/Plan:  1.  Left ankle peroneus brevis tear  2.  Left ankle possible neuritis of the superficial peroneal nerve  3.  Left lower extremity possible radicular pain  4.  Left knee possible recurrent medial meniscal tear  5.  Right knee medial meniscal tear and patellofemoral and medial compartment chondromalacia      We discussed treatment options at length and the main thing that is bothering her right now is her right knee.     I have discussed operative and non-operative treatment options and although no guarantees could be given,  pt would like to proceed with operative treatment. Plan is for knee scope with debridement as needed. We discussed the  procedure in detail including use of a model as well as  post op protocol. Risks, benefits, potential outcomes, and complications were reviewed and can include but are not limited to heart attack, stroke, death, pneumonia, infection, bleeding, damage to blood vessels, nerves, or tendons, blood clot, pulmonary embolus,persistent or worsening symptoms, stiffness, need for subsequent surgery, and failure to return to presurgery  "and precondition levels of activity.Pt voiced a clear understanding of these. This will be scheduled on an outpatient basis at a mutually convenient time. Pt was encouraged to call with any questions prior to surgery.        Regarding her left leg she is been seen by spine specialist in the past it does not sound like the work-up was fruitful and she is going to get her EMG nerve conduction study to me we will see anything further on it I do not know there is any further work-up to do regarding the neuritic symptoms in her left leg and she declined seeing a neurologist.  It may be that she is having some burning pain from the symptoms in her left knee and from the peroneal tendon pathology but were not going to address those from a surgical standpoint at this time.  We discussed injection to the left knee which she declined at this time as it was not a \"fix\" but told her it could alleviate symptoms but decided to hold off on it at this point.     At this point will hold off on do anything with her left leg syndrome proceed with arthroscopy of the right knee with debridement hopefully help with some of her mechanical symptoms and she does work on getting him with a rheumatologist.       "

## 2020-06-02 ENCOUNTER — HOSPITAL ENCOUNTER (OUTPATIENT)
Facility: HOSPITAL | Age: 50
Setting detail: HOSPITAL OUTPATIENT SURGERY
Discharge: HOME | End: 2020-06-02
Attending: ORTHOPAEDIC SURGERY | Admitting: ORTHOPAEDIC SURGERY

## 2020-06-02 ENCOUNTER — ANESTHESIA EVENT (OUTPATIENT)
Dept: PERIOP | Facility: HOSPITAL | Age: 50
End: 2020-06-02

## 2020-06-02 ENCOUNTER — ANESTHESIA (OUTPATIENT)
Dept: PERIOP | Facility: HOSPITAL | Age: 50
End: 2020-06-02

## 2020-06-02 VITALS
SYSTOLIC BLOOD PRESSURE: 131 MMHG | HEART RATE: 66 BPM | DIASTOLIC BLOOD PRESSURE: 97 MMHG | RESPIRATION RATE: 16 BRPM | OXYGEN SATURATION: 99 % | TEMPERATURE: 97.2 F

## 2020-06-02 DIAGNOSIS — S83.241D TEAR OF MEDIAL MENISCUS OF RIGHT KNEE, CURRENT, UNSPECIFIED TEAR TYPE, SUBSEQUENT ENCOUNTER: ICD-10-CM

## 2020-06-02 DIAGNOSIS — S83.231D COMPLEX TEAR OF MEDIAL MENISCUS OF RIGHT KNEE AS CURRENT INJURY, SUBSEQUENT ENCOUNTER: Primary | ICD-10-CM

## 2020-06-02 PROCEDURE — 25010000003 CEFAZOLIN IN DEXTROSE 2-4 GM/100ML-% SOLUTION: Performed by: ORTHOPAEDIC SURGERY

## 2020-06-02 PROCEDURE — 25010000002 PROPOFOL 10 MG/ML EMULSION: Performed by: NURSE ANESTHETIST, CERTIFIED REGISTERED

## 2020-06-02 PROCEDURE — 63710000001 DIPHENHYDRAMINE PER 50 MG: Performed by: ANESTHESIOLOGY

## 2020-06-02 PROCEDURE — 25010000002 FENTANYL CITRATE (PF) 100 MCG/2ML SOLUTION: Performed by: NURSE ANESTHETIST, CERTIFIED REGISTERED

## 2020-06-02 PROCEDURE — 25010000002 KETOROLAC TROMETHAMINE PER 15 MG: Performed by: NURSE ANESTHETIST, CERTIFIED REGISTERED

## 2020-06-02 PROCEDURE — 25010000002 ONDANSETRON PER 1 MG: Performed by: NURSE ANESTHETIST, CERTIFIED REGISTERED

## 2020-06-02 PROCEDURE — 63710000001 PROMETHAZINE PER 25 MG: Performed by: ANESTHESIOLOGY

## 2020-06-02 PROCEDURE — 25010000002 MIDAZOLAM PER 1 MG: Performed by: ANESTHESIOLOGY

## 2020-06-02 PROCEDURE — 25010000002 HYDROMORPHONE PER 4 MG: Performed by: ANESTHESIOLOGY

## 2020-06-02 PROCEDURE — 29881 ARTHRS KNE SRG MNISECTMY M/L: CPT | Performed by: ORTHOPAEDIC SURGERY

## 2020-06-02 PROCEDURE — 25010000002 DEXAMETHASONE PER 1 MG: Performed by: NURSE ANESTHETIST, CERTIFIED REGISTERED

## 2020-06-02 RX ORDER — FAMOTIDINE 10 MG/ML
20 INJECTION, SOLUTION INTRAVENOUS ONCE
Status: COMPLETED | OUTPATIENT
Start: 2020-06-02 | End: 2020-06-02

## 2020-06-02 RX ORDER — SCOLOPAMINE TRANSDERMAL SYSTEM 1 MG/1
1 PATCH, EXTENDED RELEASE TRANSDERMAL ONCE
Status: DISCONTINUED | OUTPATIENT
Start: 2020-06-02 | End: 2020-06-02 | Stop reason: HOSPADM

## 2020-06-02 RX ORDER — OXYCODONE HYDROCHLORIDE AND ACETAMINOPHEN 5; 325 MG/1; MG/1
1 TABLET ORAL ONCE AS NEEDED
Status: DISCONTINUED | OUTPATIENT
Start: 2020-06-02 | End: 2020-06-02 | Stop reason: HOSPADM

## 2020-06-02 RX ORDER — SODIUM CHLORIDE 0.9 % (FLUSH) 0.9 %
3-10 SYRINGE (ML) INJECTION AS NEEDED
Status: DISCONTINUED | OUTPATIENT
Start: 2020-06-02 | End: 2020-06-02 | Stop reason: HOSPADM

## 2020-06-02 RX ORDER — DIPHENHYDRAMINE HCL 25 MG
25 CAPSULE ORAL ONCE
Status: COMPLETED | OUTPATIENT
Start: 2020-06-02 | End: 2020-06-02

## 2020-06-02 RX ORDER — PROMETHAZINE HYDROCHLORIDE 25 MG/ML
6.25 INJECTION, SOLUTION INTRAMUSCULAR; INTRAVENOUS ONCE AS NEEDED
Status: COMPLETED | OUTPATIENT
Start: 2020-06-02 | End: 2020-06-02

## 2020-06-02 RX ORDER — ONDANSETRON 4 MG/1
4 TABLET, FILM COATED ORAL EVERY 8 HOURS PRN
Qty: 30 TABLET | Refills: 1 | Status: SHIPPED | OUTPATIENT
Start: 2020-06-02 | End: 2020-07-13

## 2020-06-02 RX ORDER — HYDROCODONE BITARTRATE AND ACETAMINOPHEN 7.5; 325 MG/1; MG/1
1 TABLET ORAL EVERY 4 HOURS PRN
Status: DISCONTINUED | OUTPATIENT
Start: 2020-06-02 | End: 2020-06-02 | Stop reason: HOSPADM

## 2020-06-02 RX ORDER — MIDAZOLAM HYDROCHLORIDE 1 MG/ML
1 INJECTION INTRAMUSCULAR; INTRAVENOUS
Status: DISCONTINUED | OUTPATIENT
Start: 2020-06-02 | End: 2020-06-02 | Stop reason: HOSPADM

## 2020-06-02 RX ORDER — OXYCODONE HYDROCHLORIDE AND ACETAMINOPHEN 5; 325 MG/1; MG/1
1-2 TABLET ORAL EVERY 4 HOURS PRN
Qty: 60 TABLET | Refills: 0 | Status: SHIPPED | OUTPATIENT
Start: 2020-06-02 | End: 2020-07-13

## 2020-06-02 RX ORDER — FENTANYL CITRATE 50 UG/ML
INJECTION, SOLUTION INTRAMUSCULAR; INTRAVENOUS AS NEEDED
Status: DISCONTINUED | OUTPATIENT
Start: 2020-06-02 | End: 2020-06-02 | Stop reason: SURG

## 2020-06-02 RX ORDER — FENTANYL CITRATE 50 UG/ML
50 INJECTION, SOLUTION INTRAMUSCULAR; INTRAVENOUS
Status: DISCONTINUED | OUTPATIENT
Start: 2020-06-02 | End: 2020-06-02 | Stop reason: HOSPADM

## 2020-06-02 RX ORDER — PROPOFOL 10 MG/ML
VIAL (ML) INTRAVENOUS AS NEEDED
Status: DISCONTINUED | OUTPATIENT
Start: 2020-06-02 | End: 2020-06-02 | Stop reason: SURG

## 2020-06-02 RX ORDER — ONDANSETRON 2 MG/ML
4 INJECTION INTRAMUSCULAR; INTRAVENOUS ONCE AS NEEDED
Status: DISCONTINUED | OUTPATIENT
Start: 2020-06-02 | End: 2020-06-02 | Stop reason: HOSPADM

## 2020-06-02 RX ORDER — LIDOCAINE HYDROCHLORIDE 10 MG/ML
0.5 INJECTION, SOLUTION EPIDURAL; INFILTRATION; INTRACAUDAL; PERINEURAL ONCE AS NEEDED
Status: DISCONTINUED | OUTPATIENT
Start: 2020-06-02 | End: 2020-06-02 | Stop reason: HOSPADM

## 2020-06-02 RX ORDER — BUPIVACAINE HYDROCHLORIDE AND EPINEPHRINE 5; 5 MG/ML; UG/ML
INJECTION, SOLUTION PERINEURAL AS NEEDED
Status: DISCONTINUED | OUTPATIENT
Start: 2020-06-02 | End: 2020-06-02 | Stop reason: HOSPADM

## 2020-06-02 RX ORDER — CEFAZOLIN SODIUM 2 G/100ML
2 INJECTION, SOLUTION INTRAVENOUS ONCE
Status: COMPLETED | OUTPATIENT
Start: 2020-06-02 | End: 2020-06-02

## 2020-06-02 RX ORDER — KETOROLAC TROMETHAMINE 30 MG/ML
INJECTION, SOLUTION INTRAMUSCULAR; INTRAVENOUS AS NEEDED
Status: DISCONTINUED | OUTPATIENT
Start: 2020-06-02 | End: 2020-06-02 | Stop reason: SURG

## 2020-06-02 RX ORDER — SODIUM CHLORIDE, SODIUM LACTATE, POTASSIUM CHLORIDE, AND CALCIUM CHLORIDE .6; .31; .03; .02 G/100ML; G/100ML; G/100ML; G/100ML
IRRIGANT IRRIGATION AS NEEDED
Status: DISCONTINUED | OUTPATIENT
Start: 2020-06-02 | End: 2020-06-02 | Stop reason: HOSPADM

## 2020-06-02 RX ORDER — PROMETHAZINE HYDROCHLORIDE 25 MG/1
25 TABLET ORAL ONCE AS NEEDED
Status: COMPLETED | OUTPATIENT
Start: 2020-06-02 | End: 2020-06-02

## 2020-06-02 RX ORDER — LIDOCAINE HYDROCHLORIDE 20 MG/ML
INJECTION, SOLUTION INFILTRATION; PERINEURAL AS NEEDED
Status: DISCONTINUED | OUTPATIENT
Start: 2020-06-02 | End: 2020-06-02 | Stop reason: SURG

## 2020-06-02 RX ORDER — SODIUM CHLORIDE, SODIUM LACTATE, POTASSIUM CHLORIDE, CALCIUM CHLORIDE 600; 310; 30; 20 MG/100ML; MG/100ML; MG/100ML; MG/100ML
9 INJECTION, SOLUTION INTRAVENOUS CONTINUOUS
Status: DISCONTINUED | OUTPATIENT
Start: 2020-06-02 | End: 2020-06-02 | Stop reason: HOSPADM

## 2020-06-02 RX ORDER — ONDANSETRON 2 MG/ML
INJECTION INTRAMUSCULAR; INTRAVENOUS AS NEEDED
Status: DISCONTINUED | OUTPATIENT
Start: 2020-06-02 | End: 2020-06-02 | Stop reason: SURG

## 2020-06-02 RX ORDER — MIDAZOLAM HYDROCHLORIDE 1 MG/ML
2 INJECTION INTRAMUSCULAR; INTRAVENOUS
Status: DISCONTINUED | OUTPATIENT
Start: 2020-06-02 | End: 2020-06-02 | Stop reason: HOSPADM

## 2020-06-02 RX ORDER — SODIUM CHLORIDE 0.9 % (FLUSH) 0.9 %
3 SYRINGE (ML) INJECTION EVERY 12 HOURS SCHEDULED
Status: DISCONTINUED | OUTPATIENT
Start: 2020-06-02 | End: 2020-06-02 | Stop reason: HOSPADM

## 2020-06-02 RX ORDER — HYDROMORPHONE HYDROCHLORIDE 1 MG/ML
0.5 INJECTION, SOLUTION INTRAMUSCULAR; INTRAVENOUS; SUBCUTANEOUS
Status: DISCONTINUED | OUTPATIENT
Start: 2020-06-02 | End: 2020-06-02 | Stop reason: HOSPADM

## 2020-06-02 RX ORDER — CEPHALEXIN 500 MG/1
500 CAPSULE ORAL EVERY 6 HOURS
Qty: 8 CAPSULE | Refills: 0 | Status: SHIPPED | OUTPATIENT
Start: 2020-06-02 | End: 2020-06-04

## 2020-06-02 RX ORDER — DEXAMETHASONE SODIUM PHOSPHATE 10 MG/ML
INJECTION INTRAMUSCULAR; INTRAVENOUS AS NEEDED
Status: DISCONTINUED | OUTPATIENT
Start: 2020-06-02 | End: 2020-06-02 | Stop reason: SURG

## 2020-06-02 RX ORDER — ENALAPRILAT 2.5 MG/2ML
0.62 INJECTION INTRAVENOUS ONCE AS NEEDED
Status: DISCONTINUED | OUTPATIENT
Start: 2020-06-02 | End: 2020-06-02 | Stop reason: HOSPADM

## 2020-06-02 RX ORDER — PROMETHAZINE HYDROCHLORIDE 25 MG/1
25 SUPPOSITORY RECTAL ONCE AS NEEDED
Status: COMPLETED | OUTPATIENT
Start: 2020-06-02 | End: 2020-06-02

## 2020-06-02 RX ADMIN — FENTANYL CITRATE 25 MCG: 50 INJECTION INTRAMUSCULAR; INTRAVENOUS at 11:20

## 2020-06-02 RX ADMIN — FAMOTIDINE 20 MG: 10 INJECTION INTRAVENOUS at 08:12

## 2020-06-02 RX ADMIN — DEXAMETHASONE SODIUM PHOSPHATE 10 MG: 10 INJECTION INTRAMUSCULAR; INTRAVENOUS at 10:56

## 2020-06-02 RX ADMIN — LIDOCAINE HYDROCHLORIDE 100 MG: 20 INJECTION, SOLUTION INFILTRATION; PERINEURAL at 10:49

## 2020-06-02 RX ADMIN — FENTANYL CITRATE 25 MCG: 50 INJECTION INTRAMUSCULAR; INTRAVENOUS at 11:29

## 2020-06-02 RX ADMIN — FENTANYL CITRATE 25 MCG: 50 INJECTION INTRAMUSCULAR; INTRAVENOUS at 11:02

## 2020-06-02 RX ADMIN — MIDAZOLAM 2 MG: 1 INJECTION INTRAMUSCULAR; INTRAVENOUS at 08:12

## 2020-06-02 RX ADMIN — SODIUM CHLORIDE, POTASSIUM CHLORIDE, SODIUM LACTATE AND CALCIUM CHLORIDE 9 ML/HR: 600; 310; 30; 20 INJECTION, SOLUTION INTRAVENOUS at 08:12

## 2020-06-02 RX ADMIN — SODIUM CHLORIDE, POTASSIUM CHLORIDE, SODIUM LACTATE AND CALCIUM CHLORIDE: 600; 310; 30; 20 INJECTION, SOLUTION INTRAVENOUS at 10:43

## 2020-06-02 RX ADMIN — OXYCODONE HYDROCHLORIDE AND ACETAMINOPHEN 1 TABLET: 5; 325 TABLET ORAL at 12:00

## 2020-06-02 RX ADMIN — KETOROLAC TROMETHAMINE 30 MG: 30 INJECTION, SOLUTION INTRAMUSCULAR; INTRAVENOUS at 11:34

## 2020-06-02 RX ADMIN — SCOPALAMINE 1 PATCH: 1 PATCH, EXTENDED RELEASE TRANSDERMAL at 08:11

## 2020-06-02 RX ADMIN — DIPHENHYDRAMINE HYDROCHLORIDE 25 MG: 25 CAPSULE ORAL at 13:33

## 2020-06-02 RX ADMIN — ONDANSETRON HYDROCHLORIDE 4 MG: 2 SOLUTION INTRAMUSCULAR; INTRAVENOUS at 11:29

## 2020-06-02 RX ADMIN — FENTANYL CITRATE 25 MCG: 50 INJECTION INTRAMUSCULAR; INTRAVENOUS at 11:09

## 2020-06-02 RX ADMIN — PROMETHAZINE HYDROCHLORIDE 25 MG: 25 TABLET ORAL at 12:00

## 2020-06-02 RX ADMIN — PROPOFOL 200 MG: 10 INJECTION, EMULSION INTRAVENOUS at 10:49

## 2020-06-02 RX ADMIN — HYDROMORPHONE HYDROCHLORIDE 0.5 MG: 1 INJECTION, SOLUTION INTRAMUSCULAR; INTRAVENOUS; SUBCUTANEOUS at 12:05

## 2020-06-02 RX ADMIN — CEFAZOLIN SODIUM 2 G: 2 INJECTION, SOLUTION INTRAVENOUS at 10:50

## 2020-06-02 NOTE — ANESTHESIA POSTPROCEDURE EVALUATION
Patient: Sarika Nugent    Procedure Summary     Date:  06/02/20 Room / Location:   [facility]  [facility]    Anesthesia Start:  1043 Anesthesia Stop:  1154    Procedure:  right KNEE ARTHROSCOPY with debridement as needed, partial medial menisectomy, abrasion chondroplasty (Right Knee) Diagnosis:       Tear of medial meniscus of right knee, current, unspecified tear type, subsequent encounter      (Tear of medial meniscus of right knee, current, unspecified tear type, subsequent encounter [W33.273J])    Surgeon:  Tam Stewart MD Provider:  Luis Cruz MD    Anesthesia Type:  general ASA Status:  2          Anesthesia Type: general    Vitals  Vitals Value Taken Time   /97 6/2/2020 12:45 PM   Temp 36.2 °C (97.2 °F) 6/2/2020 12:45 PM   Pulse 63 6/2/2020 12:45 PM   Resp 16 6/2/2020 12:45 PM   SpO2 96 % 6/2/2020 12:50 PM   Vitals shown include unvalidated device data.        Post Anesthesia Care and Evaluation    Patient location during evaluation: bedside  Patient participation: complete - patient participated  Level of consciousness: awake  Pain score: 1  Pain management: adequate  Airway patency: patent  Anesthetic complications: No anesthetic complications  PONV Status: controlled  Cardiovascular status: acceptable  Respiratory status: acceptable  Hydration status: acceptable    Comments: ---------------------------               06/02/20                      1245         ---------------------------   BP:          126/97         Pulse:         63           Resp:          16           Temp:   36.2 °C (97.2 °F)   SpO2:          97%         ---------------------------

## 2020-06-02 NOTE — ANESTHESIA PREPROCEDURE EVALUATION
Anesthesia Evaluation     Patient summary reviewed and Nursing notes reviewed   history of anesthetic complications: PONV  NPO Solid Status: > 8 hours  NPO Liquid Status: > 2 hours           Airway   Mallampati: II  TM distance: >3 FB  Neck ROM: full  Dental - normal exam     Pulmonary - negative pulmonary ROS and normal exam    breath sounds clear to auscultation  Cardiovascular - negative cardio ROS and normal exam    Rhythm: regular  Rate: normal    (-) angina, orthopnea, PND, LEE      Neuro/Psych  (+) headaches (Migraine), numbness, psychiatric history Anxiety and Depression,     GI/Hepatic/Renal/Endo    (+) obesity,       Musculoskeletal (-) negative ROS    Abdominal    Substance History - negative use     OB/GYN negative ob/gyn ROS         Other - negative ROS                       Anesthesia Plan    ASA 2     general     intravenous induction     Anesthetic plan, all risks, benefits, and alternatives have been provided, discussed and informed consent has been obtained with: patient.

## 2020-06-02 NOTE — BRIEF OP NOTE
KNEE ARTHROSCOPY  Progress Note    Sarika Nugent  6/2/2020    Pre-op Diagnosis:   Tear of medial meniscus of right knee, current, unspecified tear type, subsequent encounter [S83.241D]       Post-Op Diagnosis Codes:     * Tear of medial meniscus of right knee, current, unspecified tear type, subsequent encounter [S83.241D]    Procedure/CPT® Codes:      Procedure(s):  right KNEE ARTHROSCOPY with debridement as needed, partial medial menisectomy, abrasion chondroplasty    Surgeon(s):  Tam Stewart MD    Anesthesia: General    Staff:   Circulator: Marla Golden RN  Scrub Person: Liz Rajput    Estimated Blood Loss: minimal    Urine Voided: * No values recorded between 6/2/2020 10:43 AM and 6/2/2020 11:43 AM *    Specimens:                None    TT 30 min      Drains: * No LDAs found *    Findings: see dict    Complications: none      Tam Stewart MD     Date: 6/2/2020  Time: 11:46

## 2020-06-02 NOTE — ANESTHESIA PROCEDURE NOTES
Airway  Urgency: elective    Date/Time: 6/2/2020 10:50 AM    General Information and Staff    Patient location during procedure: OR  CRNA: Laxmi De Souza CRNA    Indications and Patient Condition  Indications for airway management: airway protection    Preoxygenated: yes  Mask difficulty assessment: 1 - vent by mask    Final Airway Details  Final airway type: supraglottic airway      Successful airway: Supreme  Size 4    Number of attempts at approach: 1  Assessment: lips, teeth, and gum same as pre-op and atraumatic intubation

## 2020-06-03 NOTE — OP NOTE
Facility: [facility]  Patient Name: Sarika Nugent  YOB: 1970  Date: 6/2/20  Medical Record Number: [id number]      Pre-op Diagnosis: 1.  Right knee recurrent medial meniscal tear  2.  Right knee chondromalacia medial compartment  3.  Right knee patellofemoral chondromalacia      Post-op Diagnosis: 1.  Complex recurrent tear right knee medial meniscus  2.  Right knee grade II-III chondromalacia medial femoral condyle and medial tibial plateau  3.  Right knee grade II-III chondromalacia central patella        Procedure(s):  1.  Right knee arthroscopy with partial medial meniscectomy  2.  Abrasion chondroplasty right medial femoral condyle  3.  Right knee abrasion chondroplasty patella    Surgeon(s):  Tam Stewart MD    Anesthesia: General  Anesthesiologist: Luis Cruz MD  CRNA: Laxmi De Souza CRNA    Staff:   Circulator: Marla Golden RN  Scrub Person: Liz Rajput  Assistants : none      Estimated Blood Loss: Minimal    Tourniquet Time: 30 minutes    Specimens: none      Drains: None    Findings: See Dictation    Complications: None      Indications for procedure: Patient has had history of previous right knee scope and has had recurrent complaints of pain with mechanical symptoms in the right knee that is been unimproved with conservative treatment although no guarantees can be given mutual decision made to proceed with operative treatment.            Description of procedure:    Preoperative informed consent and anesthesia evaluation were obtained.  IV antibiotics were administered in the surgical site was marked.  Patient was brought to the operating room placed in supine position anesthesia was induced and LMA was positioned. Well-padded tourniquet was placed right proximal thigh after exam under anesthesia showed full range of motion with stable ligamentous exam.  right leg was carefully positioned in arthroscopic leg skinner.     right leg was  prepped and draped in sterile fashion and surgical timeout was performed.  Leg was exsanguinated and pneumatic tourniquet inflated to 250 mmHg.  Anterolateral portal was established and arthroscope was introduced.     She was immediately noted to have an area of chondromalacia along the medial aspect of the femoral condyle with some loose cartilaginous edges.  Anteromedial portal was created after spinal needle localization at an probe was introduced found absolute cartilaginous edges to this.  This was debrided back to stable margins using shaver and judicious use of electrocautery.    The medial compartment was then inspected and found to have some mild fissuring along the far medial aspect of the tibial plateau but no loose cartilaginous edges there was however a complex recurrent tear along the posterior portion of the body and across the breath of the posterior horn extending to the root with loose cartilaginous fragments.  This was debrided back to stable margins using biter shaver and judicious use electrocautery back to about 30 to 50% peripheral rim which was stable to probing.    The notch was inspected and ACL was intact.  Lateral compartment was also inspected without obvious fissuring of the cartilage and only some minor inner rim fraying of the meniscus but nothing that required debridement.    Attention was then turned to the patellofemoral joint there was some small areas of softening over the superomedial aspect of the femoral trochlea as well as a small area of fraying along the far medial aspect of the patella and some central fissuring along the patella.  These areas were debrided back to stable margin using shaver and judicious use of electrocautery along the patella but nothing that required debridement along the trochlea.      The medial and lateral gutters were inspected and found to be free of any impinging plica or loose bodies.     Arthroscopic instruments were then removed and portals  were closed with 4-0 nylon suture.  The knee and portals were injected with a total of 30 cc half percent Marcaine with epinephrine.  Tourniquet was released,  wounds were hemostatic, and thigh and calf compartments are soft.  Sterile bulky dressings were applied and Ace bandages were gently placed from the toes to the upper thigh.  Patient was awakend,  transferred to stretcher and taken to recovery in stable condition

## 2020-06-11 ENCOUNTER — OFFICE VISIT (OUTPATIENT)
Dept: ORTHOPEDIC SURGERY | Facility: CLINIC | Age: 50
End: 2020-06-11

## 2020-06-11 VITALS — TEMPERATURE: 98.1 F | BODY MASS INDEX: 29.37 KG/M2 | WEIGHT: 172 LBS | HEIGHT: 64 IN

## 2020-06-11 DIAGNOSIS — M94.261 CHONDROMALACIA OF RIGHT KNEE: ICD-10-CM

## 2020-06-11 DIAGNOSIS — S83.241D TEAR OF MEDIAL MENISCUS OF RIGHT KNEE, UNSPECIFIED TEAR TYPE, UNSPECIFIED WHETHER OLD OR CURRENT TEAR, SUBSEQUENT ENCOUNTER: Primary | ICD-10-CM

## 2020-06-11 PROCEDURE — 99024 POSTOP FOLLOW-UP VISIT: CPT | Performed by: ORTHOPAEDIC SURGERY

## 2020-06-11 NOTE — PROGRESS NOTES
"Knee Exam      Patient: Sarika Nugent    YOB: 1970 49 y.o. female    Chief Complaints: knee hurts    History of Present Illness: Patient follows up right knee scope from 6/2/2020 with partial medial meniscectomy for recurrent tear as well as abrasion chondroplasty medial femoral condyle and patella with grade II-III chondromalacia of the medial femoral condyle and tibial plateau as well as central patella.    She is also been followed for left ankle peroneus brevis tear with possible superficial peroneal neuritis and possible radicular pain as well as possible recurrent left medial meniscus tear.    She was last seen for all of this on 5/18/2020 at which time we decided to just proceed with right knee scope.    I have since reviewed her EMG nerve conduction study from 5/19/2020 performed at [facility] and was felt to be a \"normal study\"    She has been off crutches since postoperative day #3 and says that her knee feels better now in the mornings than it did before surgery but still gets moderate intermittent aching pain in the right knee and has some feelings of numbness over the anterior aspect of the knee but none elsewhere in the leg.      ROS: knee pain, no fevers chills chest pain or shortness of breath  Past Medical History:   Diagnosis Date   • Anxiety and depression    • Constipation    • IBS (irritable bowel syndrome)    • Lupus (CMS/HCC)    • Migraine    • PONV (postoperative nausea and vomiting)    • Right knee pain        Physical Exam:   Vitals:    06/11/20 1321   Temp: 98.1 °F (36.7 °C)   Weight: 78 kg (172 lb)   Height: 161.3 cm (63.5\")     Well developed with normal mood.  Right knee shows mild effusion but no warmth erythema.  She lacks about 5 to 10 degrees in full extension and flexes to about 90 to 100 degrees.  Right calf is nontender without sign of DVT.  There was some diminished sensation over the anterolateral more so the medial aspect of the knee but it seems localized to the " knee and was grossly sensate light touch throughout the remainder of the leg.      Radiology: None performed      Assessment/Plan: 1.  Left ankle peroneus brevis tear  2.  Left ankle possible neuritis of the superficial peroneal nerve  3.  Left lower extremity possible radicular pain  4.  Left knee possible recurrent medial meniscal tear  5.  Right knee medial meniscal tear and patellofemoral and medial compartment chondromalacia status post right knee scope as outlined above      Right knee seems to be about status quo for this time after surgery counseled her that the numbness should improve over time and is likely due to some local swelling and I do not see any sign of infection.    Sutures removed and Steri-Strips were applied.  Encourage range of motion to the knee and will have her start some physical therapy and see her back in 4 weeks.

## 2020-06-12 ENCOUNTER — APPOINTMENT (OUTPATIENT)
Dept: INFUSION THERAPY | Facility: HOSPITAL | Age: 50
End: 2020-06-12

## 2020-06-19 ENCOUNTER — TREATMENT (OUTPATIENT)
Dept: PHYSICAL THERAPY | Facility: CLINIC | Age: 50
End: 2020-06-19

## 2020-06-19 DIAGNOSIS — R26.9 GAIT DISTURBANCE: ICD-10-CM

## 2020-06-19 DIAGNOSIS — S83.241D TEAR OF MEDIAL MENISCUS OF RIGHT KNEE, UNSPECIFIED TEAR TYPE, UNSPECIFIED WHETHER OLD OR CURRENT TEAR, SUBSEQUENT ENCOUNTER: ICD-10-CM

## 2020-06-19 DIAGNOSIS — M25.561 ACUTE PAIN OF RIGHT KNEE: Primary | ICD-10-CM

## 2020-06-19 PROCEDURE — G0283 ELEC STIM OTHER THAN WOUND: HCPCS | Performed by: PHYSICAL THERAPIST

## 2020-06-19 PROCEDURE — 97110 THERAPEUTIC EXERCISES: CPT | Performed by: PHYSICAL THERAPIST

## 2020-06-19 PROCEDURE — 97530 THERAPEUTIC ACTIVITIES: CPT | Performed by: PHYSICAL THERAPIST

## 2020-06-19 PROCEDURE — 97161 PT EVAL LOW COMPLEX 20 MIN: CPT | Performed by: PHYSICAL THERAPIST

## 2020-06-19 NOTE — PATIENT INSTRUCTIONS
Educated about Dx and exam findings, rationale and POC. Gave handout on HEP with instructions.  Ed on edema control (including wear of compressigrip with precautions), JPP, gait

## 2020-06-19 NOTE — PROGRESS NOTES
Physical Therapy Initial Evaluation and Plan of Care    Patient: Sarika Nugent   : 1970  Diagnosis/ICD-10 Code:  Acute pain of right knee [M25.561]  Referring practitioner: Tam Stewart*  Date of Initial Evaluation:  Type: THERAPY  Noted: 2020    Subjective Evaluation    History of Present Illness  Date of surgery: 2020  Mechanism of injury: No known specific injury but started having pain a few months ago, possible from increased exercise.  Had prior scope same knee ~ 3 yrs ago. This Surgery - Partial menisectomy and abrasion chondroplasty.  Also meniscus tear and cysts L knee with surgery after R heals.  Since surgery more pain both legs and feet (told due to limping?)    PMH includes Lupus      Patient Occupation: on disability   Precautions and Work Restrictions: none givenPain  Current pain ratin  At best pain ratin  Pain scale at highest: over 10.  Location: ant and post R knee and calf; constant  Quality: pressure, dull ache and burning  Relieving factors: rest and ice (elevate)  Aggravating factors: ambulation, squatting, prolonged positioning, standing and sleeping  Symptom course: not sure; still catches.    Social Support  Lives in: one-story house  Lives with: significant other    Diagnostic Tests  Abnormal x-ray: none since surgery.  MRI studies: abnormal    Treatments  Previous treatment: medication  Patient Goals  Patient goals for therapy: decreased pain  Patient goal: improve walking and return to exercise and activities           Objective          Observations     Additional Observation Details  Steri strips in place    Tenderness     Additional Tenderness Details  Very sensitive inf pat; milder TTP medial and dec sensation lat knee    Active Range of Motion     Right Knee   Flexion: 76 degrees   Extensor la degrees     Patellar Mobility     Right Knee Hypomobile in the medial, lateral, superior and inferior patellar tendon(s).     Additional Patellar  Mobility Details  + crepitus    Strength/Myotome Testing     Right Knee   Flexion: 3-  Extension: 3-    Tests     Right Knee   Positive patellar apprehension.     Additional Tests Details  Neg Homans    Swelling     Left Knee Girth Measurement (cm)   Joint line: 37.8.    Right Knee Girth Measurement (cm)   Joint line: 39 cm    Ambulation     Ambulation: Level Surfaces   Ambulation without assistive device: independent    Additional Level Surfaces Ambulation Details  Mod antalgia - lacks heel strike RLE      Subjective Questionnaire: LEFS: 19 (76% impairment)        Assessment & Plan     Assessment  Impairments: abnormal gait, abnormal or restricted ROM, activity intolerance, impaired physical strength, pain with function and safety issue  Assessment details: 48 yo F presents 2.5 wks s/p recurrent scope R knee with notable antalgia, limited and painful ROM, weakness, edema.  Also with tear Left knee and planned surgery.  Very limited tolerance to assessment and inhibited with exercises.  Will need gradually progressive PT to restore function mobility, strength, gait and other ADLs   Prognosis: fair  Prognosis details: Co-morbidities  Functional Limitations: sleeping, walking, uncomfortable because of pain, sitting, standing and stooping  Goals  Plan Goals: STGs x 2 wks  1. Decreasing edema and Increasing ROM and strength for improved ADLs  2. Pain at rest < 4/10 and with ex and ADLs < 7/10  3. Review body mechanics and joint protection principles with ADLs  4. Ambulates level surface 300 ft and 4 in steps with min antalgia    LTGs x 6 wks  1. ROM and strength WFL for normal ADLs  2. Min to no edema and tenderness  3. Ambulates > 2 min and 6 inch steps with normal gait pattern and min antalgia  4. Demonstrates independence with HEP and JPP     Plan  Therapy options: will be seen for skilled physical therapy services  Planned modality interventions: cryotherapy and electrical stimulation/Russian stimulation  Planned  therapy interventions: manual therapy, therapeutic activities, stretching, strengthening, home exercise program, gait training, body mechanics training, balance/weight-bearing training and functional ROM exercises  Frequency: 2-3 x wk.  Duration in weeks: 6  Treatment plan discussed with: patient        Manual Therapy:    0     mins  87783;  Therapeutic Exercise:    16     mins  39027;     Neuromuscular Patricio:    0    mins  03650;    Therapeutic Activity:     10     mins  63266;       E-stim                15   mins [id number]    Timed Treatment:   26   mins   Total Treatment:     62   mins    PT SIGNATURE: Radha Williamson PT   DATE TREATMENT INITIATED: 6/19/2020    Initial Certification  Certification Period: 9/17/2020  I certify that the therapy services are furnished while this patient is under my care.  The services outlined above are required by this patient, and will be reviewed every 90 days.     PHYSICIAN: Tam Stewart MD      DATE:     Please sign and return via fax to [phone].. Thank you, [facility].

## 2020-06-24 ENCOUNTER — TREATMENT (OUTPATIENT)
Dept: PHYSICAL THERAPY | Facility: CLINIC | Age: 50
End: 2020-06-24

## 2020-06-24 DIAGNOSIS — S83.241D TEAR OF MEDIAL MENISCUS OF RIGHT KNEE, UNSPECIFIED TEAR TYPE, UNSPECIFIED WHETHER OLD OR CURRENT TEAR, SUBSEQUENT ENCOUNTER: ICD-10-CM

## 2020-06-24 DIAGNOSIS — M25.561 ACUTE PAIN OF RIGHT KNEE: Primary | ICD-10-CM

## 2020-06-24 DIAGNOSIS — R26.9 GAIT DISTURBANCE: ICD-10-CM

## 2020-06-24 PROCEDURE — G0283 ELEC STIM OTHER THAN WOUND: HCPCS | Performed by: PHYSICAL THERAPIST

## 2020-06-24 PROCEDURE — 97110 THERAPEUTIC EXERCISES: CPT | Performed by: PHYSICAL THERAPIST

## 2020-06-24 NOTE — PROGRESS NOTES
Physical Therapy Daily Progress Note      Visit # 2      Subjective   Missed last appt due to Lupus flare-up.  Knee is doing pretty good - keeping it elevated more and doing exercises.    Objective   See Exercise, Manual, and Modality Logs for complete treatment.   flexion seated= 96 deg AA; attempted bike but did not tolereate    Assessment/Plan    Some improvement in ROM, gait and pain but still with moderate pain and intermittent catching and limited shai for ex    Progress ex as shai               Manual Therapy:    0     mins  82315;  Therapeutic Exercise:    45     mins  53557;     Neuromuscular Patricio:    0    mins  65408;    Therapeutic Activity:     0     mins  99448;     Gait Trainin     mins  29717;     Ultrasound:     0     mins  08247;    Work Hardening           0      mins 68507  Iontophoresis               0   mins 98602  Estim   15 min 92465      Timed Treatment:   50   mins   Total Treatment:     65   mins    Radha Williamson PT  Physical Therapist  [place] License #[id number]

## 2020-07-13 ENCOUNTER — OFFICE VISIT (OUTPATIENT)
Dept: ORTHOPEDIC SURGERY | Facility: CLINIC | Age: 50
End: 2020-07-13

## 2020-07-13 VITALS — HEIGHT: 64 IN | BODY MASS INDEX: 30.15 KG/M2 | TEMPERATURE: 98.2 F | WEIGHT: 176.6 LBS

## 2020-07-13 DIAGNOSIS — M94.261 CHONDROMALACIA OF RIGHT KNEE: Primary | ICD-10-CM

## 2020-07-13 DIAGNOSIS — S83.241D TEAR OF MEDIAL MENISCUS OF RIGHT KNEE, UNSPECIFIED TEAR TYPE, UNSPECIFIED WHETHER OLD OR CURRENT TEAR, SUBSEQUENT ENCOUNTER: ICD-10-CM

## 2020-07-13 PROBLEM — S83.242A TEAR OF MEDIAL MENISCUS OF LEFT KNEE: Status: ACTIVE | Noted: 2020-07-13

## 2020-07-13 PROBLEM — S83.241A TEAR OF MEDIAL MENISCUS OF RIGHT KNEE: Status: ACTIVE | Noted: 2020-07-13

## 2020-07-13 PROCEDURE — 99024 POSTOP FOLLOW-UP VISIT: CPT | Performed by: ORTHOPAEDIC SURGERY

## 2020-07-14 NOTE — PROGRESS NOTES
"Knee Exam      Patient: Sarika Nugent    YOB: 1970 49 y.o. 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     She is also been followed for left ankle peroneus brevis tear with possible superficial peroneal neuritis and possible radicular pain as well as possible recurrent left medial meniscus tear.  She had an EMG nerve conduction study on 5/19/2020 performed at [facility] that was interpreted as \"normal study\"    She was last seen on 6/11/2020 and has been doing physical therapy and knee pain is improving saw some slight numbness in the lateral aspect of her knee but none in her distal leg.  She gets an occasional ache with increased activity and occasional feeling of catching but nothing to the degree that she had preoperatively.  States it is better than it was before surgery.      Still gets some discomfort in her left knee as well but has more symptoms in her left ankle.  HPI    ROS: knee pain, no fevers chills chest pain or shortness of breath  Past Medical History:   Diagnosis Date   • Anxiety and depression    • Constipation    • IBS (irritable bowel syndrome)    • Lupus (CMS/HCC)    • Migraine    • PONV (postoperative nausea and vomiting)    • Right knee pain        Physical Exam:   Vitals:    07/13/20 1600   Temp: 98.2 °F (36.8 °C)   Weight: 80.1 kg (176 lb 9.6 oz)   Height: 161.3 cm (63.5\")   PainSc:   4     Well developed with normal mood.  Right knee shows no warmth erythema or sign of infection.  There is some diminished sensation of the lateral aspect of the knee but she is grossly sensate to light distal and proximal to this and demonstrates good motor strength on dorsiflexion " plantarflexion of the ankle as well as with eversion and with extension strength to the knee.  She has about 110 degrees of flexion.  Right calf and thigh are nontender without sign of DVT      Radiology: None performed      Assessment/Plan:  1.  Left ankle peroneus brevis tear  2.  Left ankle possible neuritis of the superficial peroneal nerve  3.  Left lower extremity possible radicular pain  4.  Left knee possible recurrent medial meniscal tear  5.  Right knee medial meniscal tear and patellofemoral and medial compartment chondromalacia status post right knee scope as outlined above    I am encouraged that her right knee feels better than it did before surgery I do not see any sign of infection and she is better than she was before surgery so would hold off on any injections.  She will continue with physical therapy and progress to home exercises.    I will see her back in about 6 weeks and we will see how her knee is doing at that time and then may need to address her ankle subsequent to that as it is bothering her more so than her left knee.

## 2020-08-10 ENCOUNTER — OFFICE VISIT (OUTPATIENT)
Dept: ORTHOPEDIC SURGERY | Facility: CLINIC | Age: 50
End: 2020-08-10

## 2020-08-10 ENCOUNTER — TELEPHONE (OUTPATIENT)
Dept: ORTHOPEDIC SURGERY | Facility: CLINIC | Age: 50
End: 2020-08-10

## 2020-08-10 VITALS — TEMPERATURE: 97.8 F | HEIGHT: 64 IN | BODY MASS INDEX: 30.05 KG/M2 | WEIGHT: 176 LBS

## 2020-08-10 DIAGNOSIS — M94.262 CHONDROMALACIA OF LEFT KNEE: ICD-10-CM

## 2020-08-10 DIAGNOSIS — M25.562 LEFT KNEE PAIN, UNSPECIFIED CHRONICITY: ICD-10-CM

## 2020-08-10 DIAGNOSIS — S83.242D TEAR OF MEDIAL MENISCUS OF LEFT KNEE, CURRENT, UNSPECIFIED TEAR TYPE, SUBSEQUENT ENCOUNTER: Primary | ICD-10-CM

## 2020-08-10 PROCEDURE — 73562 X-RAY EXAM OF KNEE 3: CPT | Performed by: ORTHOPAEDIC SURGERY

## 2020-08-10 PROCEDURE — 99213 OFFICE O/P EST LOW 20 MIN: CPT | Performed by: ORTHOPAEDIC SURGERY

## 2020-08-10 NOTE — TELEPHONE ENCOUNTER
Patient says she has a tear in her left knee that MWM knows about. On Saturday her left knee began hurting and is affecting her post op right knee. She went to a [facility] on Saturday. No x-rays were done. Patient wants to be seen today. Please advise.

## 2020-08-10 NOTE — PROGRESS NOTES
"Knee Exam      Patient: Sarika Nugent    YOB: 1970 49 y.o. 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     She is also been followed for left ankle peroneus brevis tear with possible superficial peroneal neuritis and possible radicular pain as well as possible recurrent left medial meniscus tear.  She had an EMG nerve conduction study on 5/19/2020 performed at [facility] that was interpreted as \"normal study\"    She was last seen on 7/13/2020 and still had some discomfort in her left knee but more symptoms in her left ankle and at that time her right knee was improving with some slight lateral numbness and occasional ache and feeling of catching but better than it was preoperatively.    She is instructed to continue with therapy and progressed home exercises and was due to be seen back on 826.  However she called this morning stating over the weekend she had some increased activity of the left leg taking the right and had significantly worsening pain in the left knee to the point that it made her almost nauseated with moderate to severe constant pain in the posterior medial aspect of the left knee with feelings of catching.  HPI    ROS: knee pain no fevers chills chest pain or shortness of breath  Past Medical History:   Diagnosis Date   • Anxiety and depression    • Constipation    • IBS (irritable bowel syndrome)    • Lupus (CMS/HCC)    • Migraine    • PONV (postoperative nausea and vomiting)    • Right knee pain        Physical Exam:   Vitals:    08/10/20 1556   Temp: 97.8 °F (36.6 °C)   Weight: 79.8 kg (176 lb)   Height: 161.3 cm (63.5\")   PainSc:   9     Well developed with normal mood.  " Right knee was briefly examined and portals appeared well-healed with minimal swelling.    Left knee showed moderate discomfort to palpation over the posterior medial aspect of the knee as well as some anteromedially exacerbated with Matt's.  Grossly stable ligamentous exam but with some guarding.      Radiology: 3 views left knee ordered evaluate pain reviewed and prior x-rays are unavailable for review at this time due to technical reasons.  There is joint space narrowing medially with some sharpening of the tibial spines no obvious fracture.  There is mild patellofemoral changes as well.      Assessment/Plan:  1.  Left ankle peroneus brevis tear  2.  Left ankle possible neuritis of the superficial peroneal nerve  3.  Left lower extremity possible radicular pain  4.  Left knee possible recurrent medial meniscal tear  5.  Right knee medial meniscal tear and patellofemoral and medial compartment chondromalacia status post right knee scope as outlined above    Symptoms in her left knee are much worse than they are in the right and still has some problems with the ankle but the left knee is much more bothersome.    Jonas and use a knee sleeve and protected weightbearing with crutches on the left knee and avoid kneeling squatting or twisting activities.  We need to get an MRI of her left knee for evaluation for meniscal tear that may require surgical treatment.  We will see her back as scheduled in about 2 weeks.

## 2020-08-11 ENCOUNTER — HOSPITAL ENCOUNTER (OUTPATIENT)
Dept: MRI IMAGING | Facility: HOSPITAL | Age: 50
Discharge: HOME | End: 2020-08-11
Admitting: ORTHOPAEDIC SURGERY

## 2020-08-11 DIAGNOSIS — M94.262 CHONDROMALACIA OF LEFT KNEE: ICD-10-CM

## 2020-08-11 DIAGNOSIS — S83.242D TEAR OF MEDIAL MENISCUS OF LEFT KNEE, CURRENT, UNSPECIFIED TEAR TYPE, SUBSEQUENT ENCOUNTER: ICD-10-CM

## 2020-08-11 PROCEDURE — 73721 MRI JNT OF LWR EXTRE W/O DYE: CPT

## 2020-08-13 ENCOUNTER — APPOINTMENT (OUTPATIENT)
Dept: OTHER | Facility: HOSPITAL | Age: 50
End: 2020-08-13

## 2020-08-13 ENCOUNTER — TELEPHONE (OUTPATIENT)
Dept: ORTHOPEDIC SURGERY | Facility: CLINIC | Age: 50
End: 2020-08-13

## 2020-08-13 DIAGNOSIS — Z09 FOLLOW UP: ICD-10-CM

## 2020-08-13 RX ORDER — DICLOFENAC SODIUM 75 MG/1
75 TABLET, DELAYED RELEASE ORAL 2 TIMES DAILY
Qty: 60 TABLET | Refills: 1 | Status: SHIPPED | OUTPATIENT
Start: 2020-08-13 | End: 2021-04-22 | Stop reason: HOSPADM

## 2020-08-13 NOTE — TELEPHONE ENCOUNTER
I called patient and spoke with her about her MRI results and is likely going to require repeat arthroscopy of the left knee but nothing we would do urgently especially while she is getting over the right knee from early June.    Should continue with ice packs and will send in a prescription for some Voltaren SR to take every 12 hours and would like to hold off on any narcotic pain medications at this point for the left knee.  We will see her back on 8/26/2020 for scheduled.

## 2020-08-13 NOTE — TELEPHONE ENCOUNTER
I do not see anything in the chart other than something under images listed his MRI outside films.  Please find out where she had the MRI done and get the results for me and placed them in her chart and let me know when this is done and I can then call her to let her know the results.  Please let her know about this.

## 2020-08-26 ENCOUNTER — OFFICE VISIT (OUTPATIENT)
Dept: ORTHOPEDIC SURGERY | Facility: CLINIC | Age: 50
End: 2020-08-26

## 2020-08-26 VITALS — HEIGHT: 64 IN | BODY MASS INDEX: 30.05 KG/M2 | WEIGHT: 176 LBS | TEMPERATURE: 98.4 F

## 2020-08-26 DIAGNOSIS — M23.007 MENISCAL CYST, LEFT: ICD-10-CM

## 2020-08-26 DIAGNOSIS — S83.242D TEAR OF MEDIAL MENISCUS OF LEFT KNEE, CURRENT, UNSPECIFIED TEAR TYPE, SUBSEQUENT ENCOUNTER: Primary | ICD-10-CM

## 2020-08-26 PROCEDURE — 99213 OFFICE O/P EST LOW 20 MIN: CPT | Performed by: ORTHOPAEDIC SURGERY

## 2020-08-26 NOTE — PROGRESS NOTES
"Knee Exam      Patient: Sarika Nugent    YOB: 1970 49 y.o. 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     She is also been followed for left ankle peroneus brevis tear with possible superficial peroneal neuritis and possible radicular pain as well as possible recurrent left medial meniscus tear.  She had an EMG nerve conduction study on 5/19/2020 performed at [facility] that was interpreted as \"normal study\"     She wasseen on 7/13/2020 and still had some discomfort in her left knee but more symptoms in her left ankle and at that time her right knee was improving with some slight lateral numbness and occasional ache and feeling of catching but better than it was preoperatively.     She is instructed to continue with therapy and progressed home exercises and was due to be seen back on 8/26/2020.  However she called the morning of 8/10/2020 stating over the prior weekend she had some increased activity of the left leg and has been favoring the the right and had significantly worsening pain in the left knee to the point that it made her almost nauseated with moderate to severe constant pain in the posterior medial aspect of the left knee with feelings of catching.    She has since been using knee sleeve and limiting activity and was sent for MRI with persistent complaints of moderate to severe pain in the left knee medially more so than laterally with occasional feelings of locking and catching.    Her right knee is improving to some degree with some occasional catching and achiness but is improving and feels better than it did before surgery.    Left ankle symptoms are without appreciable " "change      HPI    ROS: knee pain  Past Medical History:   Diagnosis Date   • Anxiety and depression    • Constipation    • IBS (irritable bowel syndrome)    • Lupus (CMS/HCC)    • Migraine    • PONV (postoperative nausea and vomiting)    • Right knee pain        Physical Exam:   Vitals:    08/26/20 1144   Temp: 98.4 °F (36.9 °C)   Weight: 79.8 kg (176 lb)   Height: 161.3 cm (63.5\")     Well developed with normal mood.  Right knee shows well-healed incisions with minimal swelling no sign of infection.  0 to 120 degrees range of motion mild discomfort medial joint line but no exacerbation of pain with Bull's.    Left knee shows moderate discomfort to palpation with exacerbation of painful popping with Matt's medially and some pain posterior laterally.  Grossly stable ligamentous exam      Radiology: MRI films and report of the left knee dated 8/11/2020 were reviewed which show a para meniscal cyst adjacent to the posterior horn and posterior aspect of the body medial meniscus measuring 13 mm x 6 mm adjacent to the posterior horn/body junction that is increased in size compared with previous exam in April of this year.  There is increased signal within the posterior horn and body of medial meniscus consistent with a recurrent tear but no meniscal displacement.      Assessment/Plan: 1.  Left ankle peroneus brevis tear  2.  Left ankle possible neuritis of the superficial peroneal nerve  3.  Left lower extremity possible radicular pain  4.  Left knee recurrent medial meniscal tear with para meniscal cyst  5.  Right knee medial meniscal tear and patellofemoral and medial compartment chondromalacia status post right knee scope as outlined above     We discussed treatment options and the right knee does appear to be improved prior to surgery and nothing further I recommend from working that up.    The left knee is bothering her more so in the left ankle.    I have discussed operative and non-operative treatment " options and although no guarantees could be given,  pt would like to proceed with operative treatment. Plan is for knee scope with debridement as needed. We discussed the  procedure in detail including use of a model as well as  post op protocol. Risks, benefits, potential outcomes, and complications were reviewed and can include but are not limited to heart attack, stroke, death, pneumonia, infection, bleeding, damage to blood vessels, nerves, or tendons, blood clot, pulmonary embolus,persistent or worsening symptoms, stiffness, need for subsequent surgery, and failure to return to presurgery and precondition levels of activity.Pt voiced a clear understanding of these. This will be scheduled on an outpatient basis at a mutually convenient time. Pt was encouraged to call with any questions prior to surgery.    Regarding her left ankle we will need to address that once we get her knees taken care of and she may still use her brace as needed for that.

## 2020-08-28 ENCOUNTER — APPOINTMENT (OUTPATIENT)
Dept: PREADMISSION TESTING | Facility: HOSPITAL | Age: 50
End: 2020-08-28

## 2020-09-01 ENCOUNTER — HOSPITAL ENCOUNTER (OUTPATIENT)
Dept: MAMMOGRAPHY | Facility: HOSPITAL | Age: 50
Discharge: HOME | End: 2020-09-01

## 2020-09-02 ENCOUNTER — APPOINTMENT (OUTPATIENT)
Dept: PREADMISSION TESTING | Facility: HOSPITAL | Age: 50
End: 2020-09-02

## 2020-09-02 VITALS
HEART RATE: 86 BPM | SYSTOLIC BLOOD PRESSURE: 118 MMHG | OXYGEN SATURATION: 99 % | TEMPERATURE: 98.2 F | BODY MASS INDEX: 32.26 KG/M2 | WEIGHT: 182.06 LBS | HEIGHT: 63 IN | DIASTOLIC BLOOD PRESSURE: 79 MMHG | RESPIRATION RATE: 16 BRPM

## 2020-09-02 LAB
ANION GAP SERPL CALCULATED.3IONS-SCNC: 9.5 MMOL/L (ref 5–15)
BASOPHILS # BLD AUTO: 0.04 10*3/MM3 (ref 0–0.2)
BASOPHILS NFR BLD AUTO: 0.6 % (ref 0–1.5)
BUN SERPL-MCNC: 14 MG/DL (ref 6–20)
BUN/CREAT SERPL: 19.4 (ref 7–25)
CALCIUM SPEC-SCNC: 9.1 MG/DL (ref 8.6–10.5)
CHLORIDE SERPL-SCNC: 104 MMOL/L (ref 98–107)
CO2 SERPL-SCNC: 24.5 MMOL/L (ref 22–29)
CREAT SERPL-MCNC: 0.72 MG/DL (ref 0.57–1)
DEPRECATED RDW RBC AUTO: 45.7 FL (ref 37–54)
EOSINOPHIL # BLD AUTO: 0.15 10*3/MM3 (ref 0–0.4)
EOSINOPHIL NFR BLD AUTO: 2.4 % (ref 0.3–6.2)
ERYTHROCYTE [DISTWIDTH] IN BLOOD BY AUTOMATED COUNT: 14.1 % (ref 12.3–15.4)
GFR SERPL CREATININE-BSD FRML MDRD: 87 ML/MIN/1.73
GLUCOSE SERPL-MCNC: 90 MG/DL (ref 65–99)
HCG SERPL QL: NEGATIVE
HCT VFR BLD AUTO: 39.6 % (ref 34–46.6)
HGB BLD-MCNC: 13.2 G/DL (ref 12–15.9)
IMM GRANULOCYTES # BLD AUTO: 0.02 10*3/MM3 (ref 0–0.05)
IMM GRANULOCYTES NFR BLD AUTO: 0.3 % (ref 0–0.5)
LYMPHOCYTES # BLD AUTO: 1.77 10*3/MM3 (ref 0.7–3.1)
LYMPHOCYTES NFR BLD AUTO: 28.3 % (ref 19.6–45.3)
MCH RBC QN AUTO: 29.3 PG (ref 26.6–33)
MCHC RBC AUTO-ENTMCNC: 33.3 G/DL (ref 31.5–35.7)
MCV RBC AUTO: 87.8 FL (ref 79–97)
MONOCYTES # BLD AUTO: 0.64 10*3/MM3 (ref 0.1–0.9)
MONOCYTES NFR BLD AUTO: 10.2 % (ref 5–12)
NEUTROPHILS NFR BLD AUTO: 3.63 10*3/MM3 (ref 1.7–7)
NEUTROPHILS NFR BLD AUTO: 58.2 % (ref 42.7–76)
NRBC BLD AUTO-RTO: 0 /100 WBC (ref 0–0.2)
PLATELET # BLD AUTO: 277 10*3/MM3 (ref 140–450)
PMV BLD AUTO: 9.6 FL (ref 6–12)
POTASSIUM SERPL-SCNC: 4.4 MMOL/L (ref 3.5–5.2)
RBC # BLD AUTO: 4.51 10*6/MM3 (ref 3.77–5.28)
SODIUM SERPL-SCNC: 138 MMOL/L (ref 136–145)
WBC # BLD AUTO: 6.25 10*3/MM3 (ref 3.4–10.8)

## 2020-09-02 PROCEDURE — 80048 BASIC METABOLIC PNL TOTAL CA: CPT | Performed by: ORTHOPAEDIC SURGERY

## 2020-09-02 PROCEDURE — 85025 COMPLETE CBC W/AUTO DIFF WBC: CPT | Performed by: ORTHOPAEDIC SURGERY

## 2020-09-02 PROCEDURE — 36415 COLL VENOUS BLD VENIPUNCTURE: CPT

## 2020-09-02 PROCEDURE — U0004 COV-19 TEST NON-CDC HGH THRU: HCPCS | Performed by: NURSE PRACTITIONER

## 2020-09-02 PROCEDURE — 84703 CHORIONIC GONADOTROPIN ASSAY: CPT | Performed by: ORTHOPAEDIC SURGERY

## 2020-09-02 PROCEDURE — C9803 HOPD COVID-19 SPEC COLLECT: HCPCS

## 2020-09-02 RX ORDER — TRAZODONE HYDROCHLORIDE 50 MG/1
50 TABLET ORAL NIGHTLY
COMMUNITY
End: 2020-09-04 | Stop reason: HOSPADM

## 2020-09-02 NOTE — H&P
"Patient: Sraika Nugent     YOB: 1970 49 y.o. 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     She is also been followed for left ankle peroneus brevis tear with possible superficial peroneal neuritis and possible radicular pain as well as possible recurrent left medial meniscus tear.  She had an EMG nerve conduction study on 5/19/2020 performed at [facility] that was interpreted as \"normal study\"     She wasseen on 7/13/2020 and still had some discomfort in her left knee but more symptoms in her left ankle and at that time her right knee was improving with some slight lateral numbness and occasional ache and feeling of catching but better than it was preoperatively.     She is instructed to continue with therapy and progressed home exercises and was due to be seen back on 8/26/2020.  However she called the morning of 8/10/2020 stating over the prior weekend she had some increased activity of the left leg and has been favoring the the right and had significantly worsening pain in the left knee to the point that it made her almost nauseated with moderate to severe constant pain in the posterior medial aspect of the left knee with feelings of catching.     She has since been using knee sleeve and limiting activity and was sent for MRI with persistent complaints of moderate to severe pain in the left knee medially more so than laterally with occasional feelings of locking and catching.     Her right knee is improving to some degree with some occasional catching and achiness but is improving and feels better than it did before surgery.     Left ankle symptoms are without appreciable " "change        HPI     ROS: knee pain  Medical History        Past Medical History:   Diagnosis Date   • Anxiety and depression     • Constipation     • IBS (irritable bowel syndrome)     • Lupus (CMS/HCC)     • Migraine     • PONV (postoperative nausea and vomiting)     • Right knee pain              Physical Exam:   Vitals       Vitals:     08/26/20 1144   Temp: 98.4 °F (36.9 °C)   Weight: 79.8 kg (176 lb)   Height: 161.3 cm (63.5\")         Well developed with normal mood.  Right knee shows well-healed incisions with minimal swelling no sign of infection.  0 to 120 degrees range of motion mild discomfort medial joint line but no exacerbation of pain with Bull's.     Left knee shows moderate discomfort to palpation with exacerbation of painful popping with Matt's medially and some pain posterior laterally.  Grossly stable ligamentous exam        Radiology: MRI films and report of the left knee dated 8/11/2020 were reviewed which show a para meniscal cyst adjacent to the posterior horn and posterior aspect of the body medial meniscus measuring 13 mm x 6 mm adjacent to the posterior horn/body junction that is increased in size compared with previous exam in April of this year.  There is increased signal within the posterior horn and body of medial meniscus consistent with a recurrent tear but no meniscal displacement.        Assessment/Plan: 1.  Left ankle peroneus brevis tear  2.  Left ankle possible neuritis of the superficial peroneal nerve  3.  Left lower extremity possible radicular pain  4.  Left knee recurrent medial meniscal tear with para meniscal cyst  5.  Right knee medial meniscal tear and patellofemoral and medial compartment chondromalacia status post right knee scope as outlined above      We discussed treatment options and the right knee does appear to be improved prior to surgery and nothing further I recommend from working that up.     The left knee is bothering her more so in the left " ankle.     I have discussed operative and non-operative treatment options and although no guarantees could be given,  pt would like to proceed with operative treatment. Plan is for knee scope with debridement as needed. We discussed the  procedure in detail including use of a model as well as  post op protocol. Risks, benefits, potential outcomes, and complications were reviewed and can include but are not limited to heart attack, stroke, death, pneumonia, infection, bleeding, damage to blood vessels, nerves, or tendons, blood clot, pulmonary embolus,persistent or worsening symptoms, stiffness, need for subsequent surgery, and failure to return to presurgery and precondition levels of activity.Pt voiced a clear understanding of these. This will be scheduled on an outpatient basis at a mutually convenient time. Pt was encouraged to call with any questions prior to surgery.     Regarding her left ankle we will need to address that once we get her knees taken care of and she may still use her brace as needed for that.

## 2020-09-02 NOTE — DISCHARGE INSTRUCTIONS
Take the following medications the morning of surgery:    WELLBUTRIN (BUPROPION)      If you are on prescription narcotic pain medication to control your pain you may also take that medication the morning of surgery.    General Instructions:  • Do not eat solid food after midnight the night before surgery.  • You may drink clear liquids day of surgery but must stop at least one hour before your hospital arrival time.  • It is beneficial for you to have a clear drink that contains carbohydrates the day of surgery.  We suggest a 12 to 20 ounce bottle of Gatorade or Powerade for non-diabetic patients or a 12 to 20 ounce bottle of G2 or Powerade Zero for diabetic patients.     Clear liquids are liquids you can see through.  Nothing red in color.     Plain water                               Sports drinks  Sodas                                   Gelatin (Jell-O)  Fruit juices without pulp such as white grape juice and apple juice  Popsicles that contain no fruit or yogurt  Tea or coffee (no cream or milk added)  Gatorade / Powerade  G2 / Powerade Zero      • Patients who avoid smoking, chewing tobacco and alcohol for 4 weeks prior to surgery have a reduced risk of post-operative complications.  Quit smoking as many days before surgery as you can.  • Do not smoke, use chewing tobacco or drink alcohol the day of surgery.   • If applicable bring your C-PAP/ BI-PAP machine.  • Bring any papers given to you in the doctor’s office.  • Wear clean comfortable clothes.  • Do not wear contact lenses, false eyelashes or make-up.  Bring a case for your glasses.   • Bring crutches or walker if applicable.  • Remove all piercings.  Leave jewelry and any other valuables at home.  • Hair extensions with metal clips must be removed prior to surgery.  • The Pre-Admission Testing nurse will instruct you to bring medications if unable to obtain an accurate list in Pre-Admission Testing.          Preventing a Surgical Site Infection:  • For  2 to 3 days before surgery, avoid shaving with a razor because the razor can irritate skin and make it easier to develop an infection.    • Any areas of open skin can increase the risk of a post-operative wound infection by allowing bacteria to enter and travel throughout the body.  Notify your surgeon if you have any skin wounds / rashes even if it is not near the expected surgical site.  The area will need assessed to determine if surgery should be delayed until it is healed.  • The night prior to surgery shower using a fresh bar of anti-bacterial soap (such as Dial) and clean washcloth.  Sleep in a clean bed with clean clothing.  Do not allow pets to sleep with you.  • Shower on the morning of surgery using a fresh bar of anti-bacterial soap (such as Dial) and clean washcloth.  Dry with a clean towel and dress in clean clothing.  • Ask your surgeon if you will be receiving antibiotics prior to surgery.  • Make sure you, your family, and all healthcare providers clean their hands with soap and water or an alcohol based hand  before caring for you or your wound.    Day of surgery:09- ARRIVE 0930 AM REPORT TO [facility]   Your arrival time is approximately two hours before your scheduled surgery time.  Upon arrival, a Pre-op nurse and Anesthesiologist will review your health history, obtain vital signs, and answer questions you may have.  The only belongings needed at this time will be a list of your home medications and if applicable your C-PAP/BI-PAP machine.  If you are staying overnight your family can leave the rest of your belongings in the car and bring them to your room later.  A Pre-op nurse will start an IV and you may receive medication in preparation for surgery, including something to help you relax.  Your family will be able to see you in the Pre-op area.  Two visitors at a time will be allowed in the Pre-op room.  While you are in surgery your family should notify the waiting room   if they leave the waiting room area and provide a contact phone number.    Please be aware that surgery does come with discomfort.  We want to make every effort to control your discomfort so please discuss any uncontrolled symptoms with your nurse.   Your doctor will most likely have prescribed pain medications.      If you are going home after surgery you will receive individualized written care instructions before being discharged.  A responsible adult must drive you to and from the hospital on the day of your surgery and stay with you for 24 hours.    If you are staying overnight following surgery, you will be transported to your hospital room following the recovery period.  [facility] has all private rooms.    If you have any questions please call Pre-Admission Testing at [phone].  Deductibles and co-payments are collected on the day of service. Please be prepared to pay the required co-pay, deductible or deposit on the day of service as defined by your plan.    Patient Education for Self-Quarantine Process    Following your COVID testing, we strongly recommend that you do not leave your home after you have been tested for COVID except to get medical care. This includes not going to work, school or to public areas.  If this is not possible for you to do please limit your activities to only required outings.  Be sure to wear a mask when you are with other people, practice social distancing and wash your hands frequently.      The following items provide additional details to keep you safe.  • Wash your hands with soap and water frequently for at least 20 seconds.   • Avoid touching your eyes, nose and mouth with unwashed hands.  • Do not share anything - utensils, towels, food from the same bowl.   • Have your own utensils, drinking glass, dishes, towels and bedding.   • Do not have visitors.   • Do use FaceTime to stay in touch with family and friends.  • You should stay in a  specific room away from others if possible.   • Stay at least 6 feet away from others in the home if you cannot have a dedicated room to yourself.   • Do not snuggle with your pet. While the CDC says there is no evidence that pets can spread COVID-19 or be infected from humans, it is probably best to avoid “petting, snuggling, being kissed or licked and sharing food (during self-quarantine)”, according to the CDC.   • Sanitize household surfaces daily. Include all high touch areas (door handles, light switches, phones, countertops, etc.)  • Do not share a bathroom with others, if possible.   • Wear a mask around others in your home if you are unable to stay in a separate room or 6 feet apart. If  you are unable to wear a mask, have your family member wear a mask if they must be within 6 feet of you.       Call your surgeon immediately if you experience any of the following symptoms:  • Sore Throat  • Shortness of Breath or difficulty breathing  • Cough  • Chills  • Body soreness or muscle pain  • Headache  • Fever  • New loss of taste or smell  • Do not arrive for your surgery ill.  Your procedure will need to be rescheduled to another time.  You will need to call your physician before the day of surgery to avoid any unnecessary exposure to hospital staff as well as other patients.

## 2020-09-03 LAB — SARS-COV-2 RNA RESP QL NAA+PROBE: NOT DETECTED

## 2020-09-04 ENCOUNTER — HOSPITAL ENCOUNTER (OUTPATIENT)
Facility: HOSPITAL | Age: 50
Setting detail: HOSPITAL OUTPATIENT SURGERY
Discharge: HOME | End: 2020-09-04
Attending: ORTHOPAEDIC SURGERY | Admitting: ORTHOPAEDIC SURGERY

## 2020-09-04 ENCOUNTER — ANESTHESIA (OUTPATIENT)
Dept: PERIOP | Facility: HOSPITAL | Age: 50
End: 2020-09-04

## 2020-09-04 ENCOUNTER — ANESTHESIA EVENT (OUTPATIENT)
Dept: PERIOP | Facility: HOSPITAL | Age: 50
End: 2020-09-04

## 2020-09-04 VITALS
RESPIRATION RATE: 16 BRPM | OXYGEN SATURATION: 98 % | TEMPERATURE: 98 F | HEART RATE: 75 BPM | SYSTOLIC BLOOD PRESSURE: 139 MMHG | DIASTOLIC BLOOD PRESSURE: 86 MMHG

## 2020-09-04 DIAGNOSIS — M23.007 MENISCAL CYST, LEFT: ICD-10-CM

## 2020-09-04 DIAGNOSIS — S83.242D TEAR OF MEDIAL MENISCUS OF LEFT KNEE, CURRENT, UNSPECIFIED TEAR TYPE, SUBSEQUENT ENCOUNTER: ICD-10-CM

## 2020-09-04 PROCEDURE — 25010000002 PROPOFOL 10 MG/ML EMULSION: Performed by: NURSE ANESTHETIST, CERTIFIED REGISTERED

## 2020-09-04 PROCEDURE — 25010000002 DEXAMETHASONE PER 1 MG: Performed by: NURSE ANESTHETIST, CERTIFIED REGISTERED

## 2020-09-04 PROCEDURE — 29881 ARTHRS KNE SRG MNISECTMY M/L: CPT | Performed by: ORTHOPAEDIC SURGERY

## 2020-09-04 PROCEDURE — 25010000002 VANCOMYCIN 10 G RECONSTITUTED SOLUTION: Performed by: ORTHOPAEDIC SURGERY

## 2020-09-04 PROCEDURE — 25010000002 ONDANSETRON PER 1 MG: Performed by: ANESTHESIOLOGY

## 2020-09-04 PROCEDURE — 25010000002 DIPHENHYDRAMINE PER 50 MG: Performed by: STUDENT IN AN ORGANIZED HEALTH CARE EDUCATION/TRAINING PROGRAM

## 2020-09-04 PROCEDURE — 25010000002 FENTANYL CITRATE (PF) 100 MCG/2ML SOLUTION: Performed by: ANESTHESIOLOGY

## 2020-09-04 PROCEDURE — 25010000002 ONDANSETRON PER 1 MG: Performed by: NURSE ANESTHETIST, CERTIFIED REGISTERED

## 2020-09-04 PROCEDURE — 25010000002 FENTANYL CITRATE (PF) 100 MCG/2ML SOLUTION: Performed by: NURSE ANESTHETIST, CERTIFIED REGISTERED

## 2020-09-04 RX ORDER — FENTANYL CITRATE 50 UG/ML
100 INJECTION, SOLUTION INTRAMUSCULAR; INTRAVENOUS
Status: DISCONTINUED | OUTPATIENT
Start: 2020-09-04 | End: 2020-09-04 | Stop reason: HOSPADM

## 2020-09-04 RX ORDER — DEXAMETHASONE SODIUM PHOSPHATE 4 MG/ML
INJECTION, SOLUTION INTRA-ARTICULAR; INTRALESIONAL; INTRAMUSCULAR; INTRAVENOUS; SOFT TISSUE AS NEEDED
Status: DISCONTINUED | OUTPATIENT
Start: 2020-09-04 | End: 2020-09-04 | Stop reason: SURG

## 2020-09-04 RX ORDER — DIPHENHYDRAMINE HYDROCHLORIDE 50 MG/ML
12.5 INJECTION INTRAMUSCULAR; INTRAVENOUS
Status: COMPLETED | OUTPATIENT
Start: 2020-09-04 | End: 2020-09-04

## 2020-09-04 RX ORDER — BUPIVACAINE HYDROCHLORIDE AND EPINEPHRINE 5; 5 MG/ML; UG/ML
INJECTION, SOLUTION PERINEURAL AS NEEDED
Status: DISCONTINUED | OUTPATIENT
Start: 2020-09-04 | End: 2020-09-04 | Stop reason: HOSPADM

## 2020-09-04 RX ORDER — ONDANSETRON 2 MG/ML
INJECTION INTRAMUSCULAR; INTRAVENOUS AS NEEDED
Status: DISCONTINUED | OUTPATIENT
Start: 2020-09-04 | End: 2020-09-04 | Stop reason: SURG

## 2020-09-04 RX ORDER — FENTANYL CITRATE 50 UG/ML
INJECTION, SOLUTION INTRAMUSCULAR; INTRAVENOUS AS NEEDED
Status: DISCONTINUED | OUTPATIENT
Start: 2020-09-04 | End: 2020-09-04 | Stop reason: SURG

## 2020-09-04 RX ORDER — FAMOTIDINE 10 MG/ML
20 INJECTION, SOLUTION INTRAVENOUS ONCE
Status: COMPLETED | OUTPATIENT
Start: 2020-09-04 | End: 2020-09-04

## 2020-09-04 RX ORDER — OXYCODONE HYDROCHLORIDE AND ACETAMINOPHEN 5; 325 MG/1; MG/1
1 TABLET ORAL ONCE AS NEEDED
Status: DISCONTINUED | OUTPATIENT
Start: 2020-09-04 | End: 2020-09-04 | Stop reason: HOSPADM

## 2020-09-04 RX ORDER — HYDRALAZINE HYDROCHLORIDE 20 MG/ML
5 INJECTION INTRAMUSCULAR; INTRAVENOUS
Status: DISCONTINUED | OUTPATIENT
Start: 2020-09-04 | End: 2020-09-04 | Stop reason: HOSPADM

## 2020-09-04 RX ORDER — ONDANSETRON 2 MG/ML
4 INJECTION INTRAMUSCULAR; INTRAVENOUS ONCE AS NEEDED
Status: COMPLETED | OUTPATIENT
Start: 2020-09-04 | End: 2020-09-04

## 2020-09-04 RX ORDER — FLUMAZENIL 0.1 MG/ML
0.2 INJECTION INTRAVENOUS AS NEEDED
Status: DISCONTINUED | OUTPATIENT
Start: 2020-09-04 | End: 2020-09-04 | Stop reason: HOSPADM

## 2020-09-04 RX ORDER — SODIUM CHLORIDE, SODIUM LACTATE, POTASSIUM CHLORIDE, CALCIUM CHLORIDE 600; 310; 30; 20 MG/100ML; MG/100ML; MG/100ML; MG/100ML
9 INJECTION, SOLUTION INTRAVENOUS CONTINUOUS
Status: DISCONTINUED | OUTPATIENT
Start: 2020-09-04 | End: 2020-09-04 | Stop reason: HOSPADM

## 2020-09-04 RX ORDER — SCOLOPAMINE TRANSDERMAL SYSTEM 1 MG/1
1 PATCH, EXTENDED RELEASE TRANSDERMAL ONCE
Status: DISCONTINUED | OUTPATIENT
Start: 2020-09-04 | End: 2020-09-04 | Stop reason: HOSPADM

## 2020-09-04 RX ORDER — HYDROCODONE BITARTRATE AND ACETAMINOPHEN 7.5; 325 MG/1; MG/1
1 TABLET ORAL ONCE AS NEEDED
Status: COMPLETED | OUTPATIENT
Start: 2020-09-04 | End: 2020-09-04

## 2020-09-04 RX ORDER — OXYCODONE HYDROCHLORIDE AND ACETAMINOPHEN 5; 325 MG/1; MG/1
1-2 TABLET ORAL EVERY 4 HOURS PRN
Qty: 60 TABLET | Refills: 0 | Status: SHIPPED | OUTPATIENT
Start: 2020-09-04 | End: 2021-04-22 | Stop reason: HOSPADM

## 2020-09-04 RX ORDER — SODIUM CHLORIDE 0.9 % (FLUSH) 0.9 %
3-10 SYRINGE (ML) INJECTION AS NEEDED
Status: DISCONTINUED | OUTPATIENT
Start: 2020-09-04 | End: 2020-09-04 | Stop reason: HOSPADM

## 2020-09-04 RX ORDER — FENTANYL CITRATE 50 UG/ML
50 INJECTION, SOLUTION INTRAMUSCULAR; INTRAVENOUS
Status: DISCONTINUED | OUTPATIENT
Start: 2020-09-04 | End: 2020-09-04

## 2020-09-04 RX ORDER — HYDROMORPHONE HYDROCHLORIDE 1 MG/ML
0.5 INJECTION, SOLUTION INTRAMUSCULAR; INTRAVENOUS; SUBCUTANEOUS
Status: DISCONTINUED | OUTPATIENT
Start: 2020-09-04 | End: 2020-09-04 | Stop reason: HOSPADM

## 2020-09-04 RX ORDER — SODIUM CHLORIDE, SODIUM LACTATE, POTASSIUM CHLORIDE, AND CALCIUM CHLORIDE .6; .31; .03; .02 G/100ML; G/100ML; G/100ML; G/100ML
IRRIGANT IRRIGATION AS NEEDED
Status: DISCONTINUED | OUTPATIENT
Start: 2020-09-04 | End: 2020-09-04 | Stop reason: HOSPADM

## 2020-09-04 RX ORDER — EPHEDRINE SULFATE 50 MG/ML
5 INJECTION, SOLUTION INTRAVENOUS ONCE AS NEEDED
Status: DISCONTINUED | OUTPATIENT
Start: 2020-09-04 | End: 2020-09-04 | Stop reason: HOSPADM

## 2020-09-04 RX ORDER — PROPOFOL 10 MG/ML
VIAL (ML) INTRAVENOUS AS NEEDED
Status: DISCONTINUED | OUTPATIENT
Start: 2020-09-04 | End: 2020-09-04 | Stop reason: SURG

## 2020-09-04 RX ORDER — SODIUM CHLORIDE 0.9 % (FLUSH) 0.9 %
3 SYRINGE (ML) INJECTION EVERY 12 HOURS SCHEDULED
Status: DISCONTINUED | OUTPATIENT
Start: 2020-09-04 | End: 2020-09-04 | Stop reason: HOSPADM

## 2020-09-04 RX ORDER — OXYCODONE AND ACETAMINOPHEN 7.5; 325 MG/1; MG/1
1 TABLET ORAL ONCE AS NEEDED
Status: DISCONTINUED | OUTPATIENT
Start: 2020-09-04 | End: 2020-09-04 | Stop reason: HOSPADM

## 2020-09-04 RX ORDER — CLINDAMYCIN HYDROCHLORIDE 150 MG/1
150 CAPSULE ORAL 3 TIMES DAILY
Qty: 6 CAPSULE | Refills: 0 | Status: SHIPPED | OUTPATIENT
Start: 2020-09-04 | End: 2020-09-06

## 2020-09-04 RX ORDER — MIDAZOLAM HYDROCHLORIDE 1 MG/ML
2 INJECTION INTRAMUSCULAR; INTRAVENOUS
Status: DISCONTINUED | OUTPATIENT
Start: 2020-09-04 | End: 2020-09-04 | Stop reason: HOSPADM

## 2020-09-04 RX ORDER — LIDOCAINE HYDROCHLORIDE 20 MG/ML
INJECTION, SOLUTION INFILTRATION; PERINEURAL AS NEEDED
Status: DISCONTINUED | OUTPATIENT
Start: 2020-09-04 | End: 2020-09-04 | Stop reason: SURG

## 2020-09-04 RX ORDER — LIDOCAINE HYDROCHLORIDE 10 MG/ML
0.5 INJECTION, SOLUTION EPIDURAL; INFILTRATION; INTRACAUDAL; PERINEURAL ONCE AS NEEDED
Status: DISCONTINUED | OUTPATIENT
Start: 2020-09-04 | End: 2020-09-04 | Stop reason: HOSPADM

## 2020-09-04 RX ADMIN — DIPHENHYDRAMINE HYDROCHLORIDE 12.5 MG: 50 INJECTION, SOLUTION INTRAMUSCULAR; INTRAVENOUS at 14:56

## 2020-09-04 RX ADMIN — VANCOMYCIN HYDROCHLORIDE 1250 MG: 10 INJECTION, POWDER, LYOPHILIZED, FOR SOLUTION INTRAVENOUS at 10:59

## 2020-09-04 RX ADMIN — PROPOFOL 25 MCG/KG/MIN: 10 INJECTION, EMULSION INTRAVENOUS at 13:17

## 2020-09-04 RX ADMIN — FAMOTIDINE 20 MG: 10 INJECTION INTRAVENOUS at 10:33

## 2020-09-04 RX ADMIN — SODIUM CHLORIDE, POTASSIUM CHLORIDE, SODIUM LACTATE AND CALCIUM CHLORIDE 9 ML/HR: 600; 310; 30; 20 INJECTION, SOLUTION INTRAVENOUS at 10:33

## 2020-09-04 RX ADMIN — ONDANSETRON HYDROCHLORIDE 4 MG: 2 SOLUTION INTRAMUSCULAR; INTRAVENOUS at 14:12

## 2020-09-04 RX ADMIN — FENTANYL CITRATE 50 MCG: 50 INJECTION INTRAMUSCULAR; INTRAVENOUS at 13:36

## 2020-09-04 RX ADMIN — HYDROCODONE BITARTRATE AND ACETAMINOPHEN 1 TABLET: 7.5; 325 TABLET ORAL at 14:41

## 2020-09-04 RX ADMIN — LIDOCAINE HYDROCHLORIDE 80 MG: 20 INJECTION, SOLUTION INFILTRATION; PERINEURAL at 13:17

## 2020-09-04 RX ADMIN — DIPHENHYDRAMINE HYDROCHLORIDE 12.5 MG: 50 INJECTION, SOLUTION INTRAMUSCULAR; INTRAVENOUS at 15:11

## 2020-09-04 RX ADMIN — ONDANSETRON HYDROCHLORIDE 4 MG: 2 SOLUTION INTRAMUSCULAR; INTRAVENOUS at 14:35

## 2020-09-04 RX ADMIN — PROPOFOL 200 MG: 10 INJECTION, EMULSION INTRAVENOUS at 13:17

## 2020-09-04 RX ADMIN — SCOPALAMINE 1 PATCH: 1 PATCH, EXTENDED RELEASE TRANSDERMAL at 10:34

## 2020-09-04 RX ADMIN — DEXAMETHASONE SODIUM PHOSPHATE 8 MG: 4 INJECTION INTRA-ARTICULAR; INTRALESIONAL; INTRAMUSCULAR; INTRAVENOUS; SOFT TISSUE at 13:25

## 2020-09-04 RX ADMIN — FENTANYL CITRATE 50 MCG: 50 INJECTION INTRAMUSCULAR; INTRAVENOUS at 13:17

## 2020-09-04 RX ADMIN — SODIUM CHLORIDE, POTASSIUM CHLORIDE, SODIUM LACTATE AND CALCIUM CHLORIDE: 600; 310; 30; 20 INJECTION, SOLUTION INTRAVENOUS at 14:05

## 2020-09-04 RX ADMIN — FENTANYL CITRATE 50 MCG: 50 INJECTION, SOLUTION INTRAMUSCULAR; INTRAVENOUS at 14:40

## 2020-09-04 NOTE — ANESTHESIA PREPROCEDURE EVALUATION
Anesthesia Evaluation     Patient summary reviewed and Nursing notes reviewed   history of anesthetic complications: PONV  NPO Solid Status: > 8 hours             Airway   Mallampati: II  TM distance: >3 FB  Neck ROM: full  No difficulty expected  Dental - normal exam     Pulmonary - negative pulmonary ROS and normal exam    breath sounds clear to auscultation  Cardiovascular - negative cardio ROS and normal exam    Rhythm: regular  Rate: normal    (-) angina, orthopnea, PND, LEE      Neuro/Psych  (+) headaches (Migraine), numbness, psychiatric history Anxiety and Depression,     GI/Hepatic/Renal/Endo    (+) obesity,       Musculoskeletal (-) negative ROS    Abdominal    Substance History - negative use     OB/GYN negative ob/gyn ROS         Other - negative ROS       ROS/Med Hx Other: lupus                    Anesthesia Plan    ASA 2     general   (Ponv - background propofol)  intravenous induction     Anesthetic plan, all risks, benefits, and alternatives have been provided, discussed and informed consent has been obtained with: patient.    Plan discussed with CRNA.

## 2020-09-04 NOTE — ANESTHESIA POSTPROCEDURE EVALUATION
Patient: Sarika Nugent    Procedure Summary     Date:  09/04/20 Room / Location:   [facility]  [facility]    Anesthesia Start:  1315 Anesthesia Stop:  1430    Procedure:  left KNEE ARTHROSCOPY, PARTIAL MEDIAL MENISECTOMY, PLICA EXCISION, ABRASION CHONDROPLASTY (Left Knee) Diagnosis:       Tear of medial meniscus of left knee, current, unspecified tear type, subsequent encounter      Meniscal cyst, left      (Tear of medial meniscus of left knee, current, unspecified tear type, subsequent encounter [S83.242D])      (Meniscal cyst, left [M23.007])    Surgeon:  Tam Stewart MD Provider:  Librado Perez MD    Anesthesia Type:  general ASA Status:  2          Anesthesia Type: general    Vitals  Vitals Value Taken Time   BP     Temp     Pulse 81 9/4/2020  2:40 PM   Resp     SpO2 99 % 9/4/2020  2:40 PM   Vitals shown include unvalidated device data.        Post Anesthesia Care and Evaluation    Patient location during evaluation: bedside  Patient participation: complete - patient participated  Level of consciousness: awake and alert  Pain management: adequate  Airway patency: patent  Anesthetic complications: No anesthetic complications  PONV Status: controlled  Cardiovascular status: blood pressure returned to baseline and acceptable  Respiratory status: acceptable  Hydration status: acceptable

## 2020-09-04 NOTE — OP NOTE
Facility: [facility]  Patient Name: Sarika Nugent  YOB: 1970  Date: 9/4/2020  Medical Record Number: [id number]      Pre-op Diagnosis: Left knee recurrent medial meniscal tear with para meniscal cyst      Post-op Diagnosis:   1.  Left knee recurrent complex posterior horn and body medial meniscal tear with delamination  2.  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  Left knee impinging plica  4.  Left knee central patella chondromalacial fissuring        Procedure(s):  1.  Left knee partial medial meniscectomy  2.  Left knee medial femoral condyle abrasion chondroplasty  3.  Left knee plica excision  4.  Left knee patellar abrasion chondroplasty    Surgeon(s):  Tam Stewart MD    Anesthesia: General  Anesthesiologist: Librado Perez MD  CRNA: Magaly Hogue CRNA    Staff:   Circulator: Jeremy Staley RN  Scrub Person: Anna Davies; Opal Shukla  Assistants : none      Estimated Blood Loss: Minimal    Tourniquet Time: 32 minutes    Specimens: none      Drains: None    Findings: See Dictation    Complications: None      Indications for procedure: Patient is had a history of recurrent progressive worsening pain in the left knee with mechanical symptoms with previous history of knee scope in the remote past elsewhere.  MRI has shown recurrent meniscal tear and we discussed operative and nonoperative treatment options and although no guarantees can be given decision was made to proceed with operative treatment.  She voiced clear understanding of risk benefits potential outcomes and complications2            Description of procedure:    Preoperative informed consent and anesthesia evaluation were obtained.  IV antibiotics were administered in the surgical site was marked.  Patient was brought to the operating room placed in supine position anesthesia was induced and LMA was positioned. Well-padded tourniquet was placed   left proximal thigh after exam under anesthesia showed full range of motion with stable ligamentous exam.  left leg was carefully positioned in arthroscopic leg skinner.     left leg was prepped and draped in sterile fashion and surgical timeout was performed.  Leg was exsanguinated and pneumatic tourniquet inflated to 250 mmHg.  Anterolateral portal was established and arthroscope was introduced.  I immediately noted grade 2-3 chondromalacial changes on the medial femoral condyle with fronting and fissuring.    Anteromedial portal was created after spinal needle localization and the posterior horn and body of the medial meniscus was identified and probed and found have a large complex tear of a horizontal nature with superior and inferior flaps that were easily displaceable extending across the breadth of the posterior horn and the majority of the body extending into the anterior horn body junction.  This was debrided back to stable margins using biter shaver and judicious use of electrocautery and exchanging portals were necessary and corresponded to the area of the para meniscal cyst.  The remaining peripheral rim was then probed and found to be stable    Abrasion chondroplasty was then performed over the medial femoral condyle back to stable margins.    Notch was inspected and found to be somewhat narrowed but ACL was intact.    Lateral compartment was inspected and not found to have any obvious cartilaginous defects or meniscal tear although there was some mild inner rim fraying.    The patellofemoral compartment was inspected and found to have an impinging plica medially that was resected back to stable margins such there is no further impingement and found to have 1 small area of central fissuring of the patella that was gently debrided with a shaver back to stable margin.    The medial and lateral gutters were inspected and found to be free of any further impinging structures or loose bodies.          Arthroscopic instruments were then removed and portals were closed with 4-0 nylon suture.  The knee and portals were injected with a total of 30 cc half percent Marcaine with epinephrine.  Tourniquet was released,  wounds were hemostatic, and thigh and calf compartments are soft.  Sterile bulky dressings were applied and Ace bandages were gently placed from the toes to the upper thigh.  Patient was awakend,  transferred to stretcher and taken to recovery in stable condition

## 2020-09-04 NOTE — BRIEF OP NOTE
KNEE ARTHROSCOPY  Progress Note    Sarika Nugent  9/4/2020    Pre-op Diagnosis:   Tear of medial meniscus of left knee, current, unspecified tear type, subsequent encounter [S83.242D]  Meniscal cyst, left [M23.007]       Post-Op Diagnosis Codes:     * Tear of medial meniscus of left knee, current, unspecified tear type, subsequent encounter [S83.242D]     * Meniscal cyst, left [M23.007]  Chondromalacia  plica    Procedure/CPT® Codes:        Procedure(s):  left KNEE ARTHROSCOPY, PARTIAL MEDIAL MENISECTOMY, PLICA EXCISION, ABRASION CHONDROPLASTY    Surgeon(s):  Tam Stewart MD    Anesthesia: General    Staff:   Circulator: Jeremy Staley RN  Scrub Person: Anna Davies Noraine         Estimated Blood Loss: minimal    Urine Voided: 0 mL    Specimens:                None    Tt 32      Drains: * No LDAs found *    Findings: see dict    Complications: none          Tam Stewart MD     Date: 9/4/2020  Time: 14:21

## 2020-09-09 ENCOUNTER — TELEPHONE (OUTPATIENT)
Dept: SURGERY | Facility: CLINIC | Age: 50
End: 2020-09-09

## 2020-09-09 NOTE — TELEPHONE ENCOUNTER
Patient called to cancel appointment on 9/9/20 with Dr. Gasca.    Since her MMG was negative she does not feel the appointment is necessary.      Appointment has been cancelled.

## 2020-09-10 ENCOUNTER — OFFICE VISIT (OUTPATIENT)
Dept: SURGERY | Facility: CLINIC | Age: 50
End: 2020-09-10

## 2020-09-10 VITALS
WEIGHT: 177 LBS | DIASTOLIC BLOOD PRESSURE: 94 MMHG | HEIGHT: 63 IN | SYSTOLIC BLOOD PRESSURE: 139 MMHG | RESPIRATION RATE: 17 BRPM | BODY MASS INDEX: 31.36 KG/M2 | HEART RATE: 109 BPM | OXYGEN SATURATION: 98 %

## 2020-09-10 DIAGNOSIS — D24.2 FIBROADENOMA OF LEFT BREAST IN FEMALE: Primary | ICD-10-CM

## 2020-09-10 PROCEDURE — 99213 OFFICE O/P EST LOW 20 MIN: CPT | Performed by: SURGERY

## 2020-09-10 NOTE — PROGRESS NOTES
[facility]     Referring Provider: FAB Ryan     Chief complaint: F/u left breast fibroadenoma     HPI:   10/7/19:  Ms. Sarika Nugent is a 49 yo woman, seen at the request of FAB Ryan, for a new diagnosis of left breast fibroadenoma. This was initially detected as an imaging abnormality on routine screening on 8/30/19. She underwent diagnostic imaging, however there was no ultrasound correlate for the 2.9 cm mass seen on mammogram. She subsequently underwent a stereotactic biopsy, which showed a fibroadenoma (I sent her biopsy pathology to Dr. Feng Rabago for a second opinion). Her work-up is detailed in the breast history section below. Prior to the biopsy, she denies any breast lumps, pain, skin changes, or nipple discharge.     She has a past history of a left breast ultrasound-guided biopsy in 2013 which showed fibroadenomatoid change. She has a family history of breast cancer in her maternal grandmother (diagnosed in her 50s). She denies any family history of ovarian cancer.      9/10/20, Interval History:  She was initially supposed to follow-up with me in March, however that appointment was delayed due to the pandemic. She did undergo a follow-up left MMG & US in March, and the report said that the mass had increased in size to 2.7 cm from 2.5 cm. However the mass had never been previously visualized on ultrasound and initially measured 2.9 mm on her 2019 mammogram. She underwent screening mammogram and follow-up left ultrasound in 9/2020 prior to her appointment, which demonstrated a stable mass. See imaging report details below. She still cannot feel the mass and denies any associated symptoms.       Breast history/imaging (updated 9/10/20):    9/6/13, Screening MMG ([place]):   Scattered fibroglandular densities. There is a round mass seen in the anterior depth region of the left breast at 3:00. There are no masses, suspicious calcifications, or other abnormalities seen in the  right breast.   BI-RADS 0: Incomplete.     9/20/13, Left Diagnostic MMG & US ([place]):   MMG:  An oval mass persisted on spot compression in the anterior depth lateral left breast.   US:  At the 3:00 position 3 cm from the nipple a 1.4 cm maximal diameter oval mildly heterogeneously hypoechoic parallel mass was identified which has benign imaging characteristics and likely represents a fibroadenoma. The mass is retrospectively palpable on targeted physical exam performed by myself. The finding correlates nicely with the mammogram. The patient prefers ultrasound guided core needle biopsy.   BI-RADS 4A: Suspicious.     9/26/13, Left Breast, US-Guided Biopsy ([facility]):   Left Breast Mass at 3:00, Core Needle Biopsy:   1. Benign Breast Tissue with Fibroadenomatoid Change, Apocrine Metaplasia, and Stromal Fibrosis.   2. No Atypia or Malignancy Identified.  -Concordant.     1/5/15, Screening MMG ([facility]):   Scattered fibroglandular densities. There is a biopsy marker associated with a faint nodular density in the upper outer hemisphere left breast, middle third depth. There is no new dominant nodule, mass or suspicious cluster of microcalcifications.  BI-RADS 2: Benign.     4/20/16, Screening MMG ([facility]):   Biopsy marker associated with a biopsy-proven fibroadenoma in the left breast is again noted. Breast parenchyma is composed of scattered fibroglandular densities. The pattern is unchanged. There is no new dominant nodule, mass, or suspicious cluster of microcalcifications.   BI-RADS 2: Benign.     8/30/19, Screening MMG with Tomosynthesis ([facility]):  Scattered areas of fibroglandular density.  No suspicious mass, area of architectural distortion or suspicious microcalcification is identified in the right breast. Stable 1 cm isodense nodule in the anterior outer left breast with associated biopsy clip. New or enlarged 2.5 cm isodense nodule in the mid depth slightly outer left breast. No concerning  microcalcification or areas of architectural distortion in the left breast.   BI-RADS 0: Incomplete.      9/6/19, Left Diagnostic MMG & Left Breast US ([facility]):   MMG:  There is a well-circumscribed round, partially obscured mass in the superior left breast at the 12:00-1:00 position. This measures 2.9 x 2.4 x 2.7 cm. There no microcalcifications or architectural distortion. The lesion is positioned approximately 7 cm posterior to the nipple. Is been previous biopsy in the lateral left breast.   US:  No suspicious ultrasound findings are identified. No mass or evidence of malignancy. An ultrasound correlates for the rounded mass in the left breast is not clearly identified.   IMPRESSION:   Incomplete mammogram. There is a 2.7 cm well-circumscribed mass in the left breast, presumably at the 12:00 to 1:00 position. It is unusual that no ultrasound correlates is identified. I would recommend a 3-D mammogram to confirm that the mass is truly present in this is not artifact from overlapping fibroglandular tissue. If this remains on the 3-D images, a stereotactic biopsy would be recommended.   BI-RADS 0: Incomplete.     9/10/19, Left Breast, Stereotactic Biopsy ([facility]):   1. Left Breast Tissue, Core Biopsies  Segments of Breast Tissue Containing Focal Sclerosing Adenosis, Very Focal Hyperplasia, Usual Type, and Segments of a Fibroglandular Lesion with Prominent Fibrosis.   Comment: The fibrous component has a locally prominent spindled/ fascicular pattern. Different diagnosis would include a fibroadenoma, as well as a myoepithelioma.   -Concordant.  Dr. Feng Rabago, Pathology Consultation ([facility]):   Breast, Left Core Needle Biopsy (SP-, 3 Slides):   - Fibroadenoma; Usual Hyperplasia without Atypia.  Comment: I essentially agree with the original pathologist's diagnosis. The fibroadenoma has myofibroblastic stroma, a finding of no clinical significance.     3/9/20,  Left Diagnostic MMG with Ki & Left Breast US ([facility]):  MMG:  In the interval a biopsy clip slightly eccentric to a 2.7 x 1.8 cm macrolobular mass central slightly outer left breast, reportedly benign per patient. The mass previously measured 2.5 x 1.5 cm. A 2nd stable biopsy clip in the outer slightly upper left breast from remote breast biopsy. There are no suspicious microcalfications or areas of architectural distortion.  US:  High-resolution focused left breast ultrasound at the 2 o’clock position, 3 cm from the nipple but at a slightly greater depth is a 2.8 x 2 x 1.2 cm macrolobular mass with visualization of the eccentric clip corresponding to the mammographic lesion. A 1 cm parallel heterogeneous solid mass at the 3 o’clock position 5 cm from the nipple corresponds to stable previously biopsied mammographic mass.   BI-RADS: 3 Probably benign. Recommend short term follow-up ultrasound left breast in 6 months.    9/1/20, Screening MMG with Ki & Left Breast US ([facility]):  MMG:  Scattered areas of fibroglandular density. Previously biopsied 2.7 cm oval circumscribed mass in the central left breast, middle depth, is stable in size compared to 3/9/2020, but slightly increased in size compared to 8/30/2019, as described on the previous exam. Previously biopsied 1.2 cm oval circumscribed mass in the outer central left breast, middle depth, is also stable. No suspicious mass, area of architectural distortion or suspicious microcalcification is identified.  US:  Oval circumscribed hypoechoic mass in the left breast at 2:00, 3 cm from the nipple, is difficult to fully characterize due to its depth. It measures 2.8 x 1.2 x 2.1 cm, previously 2.8 x 1.2 x 2.0 cm on 3/9/2020. If this mass is not surgically excised, recommend continued imaging surveillance.  BI-RADS 2: Benign.      Review of Systems:  See interval history.       Medications:    Current Outpatient Medications:   •  AIMOVIG 70 MG/ML prefilled syringe, Inject 70  mg under the skin into the appropriate area as directed Every 30 (Thirty) Days. TREATMENT FOR MIGRAINES, Disp: , Rfl:   •  buPROPion XL (WELLBUTRIN XL) 150 MG 24 hr tablet, Take 150 mg by mouth Every Morning., Disp: , Rfl:   •  diclofenac (VOLTAREN) 75 MG EC tablet, Take 1 tablet by mouth 2 (Two) Times a Day. (Patient taking differently: Take 75 mg by mouth 2 (Two) Times a Day. HOLD PRIOR TO SURGERY), Disp: 60 tablet, Rfl: 1  •  linaclotide (LINZESS) 145 MCG capsule capsule, Take 145 mcg by mouth Daily., Disp: , Rfl:   •  oxyCODONE-acetaminophen (PERCOCET) 5-325 MG per tablet, Take 1-2 tablets by mouth Every 4 (Four) Hours As Needed (pain). 1 tab for mild pain, 2 tabs for moderate pain, Disp: 60 tablet, Rfl: 0  •  tiZANidine (ZANAFLEX) 4 MG tablet, Take 4 mg by mouth At Night As Needed., Disp: , Rfl: 0      Allergies   Allergen Reactions   • Clindamycin Swelling and Rash   • Erythromycin Rash and Other (See Comments)     INTERNAL Bleeding   • Fentanyl Itching   • Hydrocodone-Acetaminophen Itching   • Levofloxacin Swelling and Rash   • Sulfamethoxazole-Trimethoprim Swelling and Rash   • Penicillins Rash and Swelling     PT REPORTS CAN TAKE KEFLEX/KEFZOL WITHOUT ANY ISSUES.       Family History   Problem Relation Age of Onset   • Breast cancer Maternal Grandmother         in her 50's   • Brain cancer Maternal Aunt         in her 50's   • Cancer Maternal Cousin         unsure of what kind in her late 40's   • Cervical cancer Maternal Aunt         late 30's/early 40's   • Malig Hyperthermia Neg Hx        PHYSICAL EXAMINATION:   Vitals:    09/10/20 1152   BP: 139/94   Pulse: 109   Resp: 17   SpO2: 98%     ECOG 0 - Asymptomatic  General: NAD, well appearing  Psych: a&o x 3, normal mood and affect  Eyes: EOMI, no scleral icterus  ENMT: neck supple without masses or thyromegaly, mucus membranes moist  MSK: normal gait, normal ROM in bilateral shoulders  Lymph nodes: no cervical, supraclavicular or axillary  lymphadenopathy  Breast: symmetric, large size, grade 1 ptosis, dense tissue bilaterally  Right: No visible abnormalities on inspection while seated, with arms raised or hands on hips. No masses, skin changes, or nipple abnormalities.  Left: No visible abnormalities on inspection while seated, with arms raised or hands on hips. No masses, skin changes, or nipple abnormalities.    Left breast, in-office ultrasound: At 2:00, at the areolar edge, there is an oval well-circumscribed hypoechoic mass with biopsy clip visualized. This is located posteriorly, adjacent to the pectoralis muscle. This is surrounded by thick, dense fibroglandular tissue.       I have independently reviewed her imaging and here are my findings:   Stable biopsy-proven fibroadenoma in the retroareolar left breast.      Assessment:  49 y.o. F with an asymptomatic left breast fibroadenoma.    Discussion:  We again discussed the diagnosis of fibroadenoma and indications for excision. She is currently asymptomatic, however the size is borderline. We discussed the option of excising it now versus proceeding with imaging surveillance. She would prefer imaging surveillance. I explained that if the mass grows any larger, I will want to excise it.      Plan:  -F/u in 3/2021 with left US.  -She was instructed to call sooner with any questions, concerns or changes on BSE.    Letty Gasca MD      CC:  FAB Ryan

## 2020-09-14 ENCOUNTER — TELEPHONE (OUTPATIENT)
Dept: SURGERY | Facility: CLINIC | Age: 50
End: 2020-09-14

## 2020-09-14 NOTE — TELEPHONE ENCOUNTER
US is scheduled on 3/2/2021 @ 10:30am @ [facility].    F/u appointment with Dr. Gasca is scheduled on 3/11/2021 @ 2:30pm.    Called and spoke to patient, patient expressed verbal understanding of appointment times.

## 2020-09-17 ENCOUNTER — OFFICE VISIT (OUTPATIENT)
Dept: ORTHOPEDIC SURGERY | Facility: CLINIC | Age: 50
End: 2020-09-17

## 2020-09-17 VITALS — HEIGHT: 64 IN | BODY MASS INDEX: 30.22 KG/M2 | WEIGHT: 177 LBS | TEMPERATURE: 96.4 F

## 2020-09-17 DIAGNOSIS — S83.241D TEAR OF MEDIAL MENISCUS OF RIGHT KNEE, UNSPECIFIED TEAR TYPE, UNSPECIFIED WHETHER OLD OR CURRENT TEAR, SUBSEQUENT ENCOUNTER: ICD-10-CM

## 2020-09-17 DIAGNOSIS — S83.242D TEAR OF MEDIAL MENISCUS OF LEFT KNEE, UNSPECIFIED TEAR TYPE, UNSPECIFIED WHETHER OLD OR CURRENT TEAR, SUBSEQUENT ENCOUNTER: Primary | ICD-10-CM

## 2020-09-17 DIAGNOSIS — M94.261 CHONDROMALACIA OF RIGHT KNEE: ICD-10-CM

## 2020-09-17 DIAGNOSIS — S86.312D PERONEAL TENDON TEAR, LEFT, SUBSEQUENT ENCOUNTER: ICD-10-CM

## 2020-09-17 PROCEDURE — 99213 OFFICE O/P EST LOW 20 MIN: CPT | Performed by: ORTHOPAEDIC SURGERY

## 2020-09-17 NOTE — PROGRESS NOTES
"Knee Exam      Patient: Sarika Nugent    YOB: 1970 49 y.o. female    Chief Complaints: knee doing okay    History of Present Illness: Patient follows up left  knee scope from 9/4/2020 with partial medial meniscectomy as well as abrasion chondroplasty of medial femoral condyle plica excision and patella abrasion chondroplasty.    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    She is also been followed for left ankle peroneus brevis tear with possible neuritis of the superficial peroneal nerve and some previous lower extremity radicular pain.    Said her knee is doing relatively well when she took the wrapping off 3 days postoperatively the lateral sutures came out and she put a Steri-Strip on it and has not had any drainage from that area.  She took her own medial sutures out several days ago \"because they were itching\" she reports mild aching pain in the left knee.    Her right knee she said is improving with some mild intermittent aching pain and still get some aching pain in the left knee.  Overall right knee is improved  HPI    ROS: knee pain, no fevers chills chest pain or shortness of breath  Past Medical History:   Diagnosis Date   • Anxiety and depression    • Constipation    • IBS (irritable bowel syndrome)    • Lupus (CMS/HCC)    • Migraine    • PONV (postoperative nausea and vomiting)    • Right knee pain        Physical Exam:   Vitals:    09/17/20 1553   Temp: 96.4 °F (35.8 °C)   TempSrc: Temporal   Weight: 80.3 kg (177 lb)   Height: 161.3 cm (63.5\")   PainSc:   3   PainLoc: Knee     Well developed with normal mood.  Left knee portals are closed without any drainage warmth or erythema there is mild swelling to the left knee.  0 to 110 degrees range of " motion and no exacerbation of pain with Matt's.  Left calf and thigh are nontender without sign of DVT.    Right knee shows very slight achiness over the medial joint line but good range of motion.  Left ankle showed some slight discomfort over the inferolateral hindfoot.      Radiology: None performed      Assessment/Plan:  1.  Left ankle peroneus brevis tear  2.  Left ankle possible neuritis of the superficial peroneal nerve  3.  Left lower extremity possible radicular pain  4.  Left knee scope as outlined above  5.  Right knee medial meniscal tear and patellofemoral and medial compartment chondromalacia status post right knee scope as outlined above     We reviewed treatment options and overall she does seem to be improving.    We reviewed treatment going forward and she does not want to do any physical therapy and it did therapy for her right knee and has exercise that she is going to continue doing for the left knee and continue with these for the right knee as well.    Reviewed with her that I think therapy would be of benefit to her but she feels she can do it on her own feels she can do it adequately.    If she fails to progress this would be a good option for her to start however.    At this time continue thing with her ankle to get her over her knees and then may need to do something with her ankle and she will continue with use of a brace as needed for it.    I will see her back in 4 weeks no x-rays.

## 2021-03-02 ENCOUNTER — HOSPITAL ENCOUNTER (OUTPATIENT)
Dept: ULTRASOUND IMAGING | Facility: HOSPITAL | Age: 51
Discharge: HOME | End: 2021-03-02

## 2021-03-16 ENCOUNTER — HOSPITAL ENCOUNTER (OUTPATIENT)
Dept: OTHER | Facility: HOSPITAL | Age: 51
Discharge: HOME | End: 2021-03-16
Attending: NURSE PRACTITIONER

## 2021-03-16 ENCOUNTER — TELEPHONE (OUTPATIENT)
Dept: SURGERY | Facility: CLINIC | Age: 51
End: 2021-03-16

## 2021-03-16 NOTE — TELEPHONE ENCOUNTER
Called patient to reschedule appointment with Dr. Gasca.    Appointment has been rescheduled to 4/23/2021 @ 10:30am.    Called and spoke to patient, patient expressed v/u of appointment time.    Sent patient a reminder letter in the mail.

## 2021-03-29 ENCOUNTER — HOSPITAL ENCOUNTER (OUTPATIENT)
Dept: OTHER | Facility: HOSPITAL | Age: 51
Discharge: HOME | End: 2021-03-29
Attending: NURSE PRACTITIONER

## 2021-03-31 ENCOUNTER — BULK ORDERING (OUTPATIENT)
Dept: CASE MANAGEMENT | Facility: OTHER | Age: 51
End: 2021-03-31

## 2021-03-31 DIAGNOSIS — Z23 IMMUNIZATION DUE: ICD-10-CM

## 2021-04-01 ENCOUNTER — IMMUNIZATION (OUTPATIENT)
Dept: VACCINE CLINIC | Facility: HOSPITAL | Age: 51
End: 2021-04-01

## 2021-04-01 DIAGNOSIS — Z23 IMMUNIZATION DUE: ICD-10-CM

## 2021-04-01 PROCEDURE — 91300 HC SARSCOV02 VAC 30MCG/0.3ML IM: CPT | Performed by: INTERNAL MEDICINE

## 2021-04-01 PROCEDURE — 0001A: CPT | Performed by: INTERNAL MEDICINE

## 2021-04-14 ENCOUNTER — HOSPITAL ENCOUNTER (OUTPATIENT)
Dept: GENERAL RADIOLOGY | Facility: HOSPITAL | Age: 51
Discharge: HOME | End: 2021-04-14
Attending: NURSE PRACTITIONER

## 2021-04-15 ENCOUNTER — TELEPHONE (OUTPATIENT)
Dept: ORTHOPEDIC SURGERY | Facility: CLINIC | Age: 51
End: 2021-04-15

## 2021-04-15 NOTE — TELEPHONE ENCOUNTER
PATIENT INJURED THEMSELVES 6 MONTHS AGO.  PATIENT HAS A RT TORN TENDON.  DR. MARIO BLOOM OFFICE IS CALLING TO SEE IF WE CAN WORK IN THE PATIENT.  PLEASE ADVISE

## 2021-04-19 ENCOUNTER — OFFICE VISIT (OUTPATIENT)
Dept: ORTHOPEDIC SURGERY | Facility: CLINIC | Age: 51
End: 2021-04-19

## 2021-04-19 VITALS — BODY MASS INDEX: 32.2 KG/M2 | WEIGHT: 175 LBS | TEMPERATURE: 96.2 F | HEIGHT: 62 IN

## 2021-04-19 DIAGNOSIS — M25.511 CHRONIC RIGHT SHOULDER PAIN: Primary | ICD-10-CM

## 2021-04-19 DIAGNOSIS — G89.29 CHRONIC RIGHT SHOULDER PAIN: Primary | ICD-10-CM

## 2021-04-19 DIAGNOSIS — M19.011 ARTHRITIS OF RIGHT ACROMIOCLAVICULAR JOINT: ICD-10-CM

## 2021-04-19 PROCEDURE — 20605 DRAIN/INJ JOINT/BURSA W/O US: CPT | Performed by: NURSE PRACTITIONER

## 2021-04-19 PROCEDURE — 73030 X-RAY EXAM OF SHOULDER: CPT | Performed by: NURSE PRACTITIONER

## 2021-04-19 PROCEDURE — 99214 OFFICE O/P EST MOD 30 MIN: CPT | Performed by: NURSE PRACTITIONER

## 2021-04-19 RX ORDER — MELOXICAM 15 MG/1
15 TABLET ORAL DAILY
Qty: 30 TABLET | Refills: 1 | Status: SHIPPED | OUTPATIENT
Start: 2021-04-19

## 2021-04-19 RX ORDER — METHYLPREDNISOLONE ACETATE 80 MG/ML
80 INJECTION, SUSPENSION INTRA-ARTICULAR; INTRALESIONAL; INTRAMUSCULAR; SOFT TISSUE
Status: COMPLETED | OUTPATIENT
Start: 2021-04-19 | End: 2021-04-19

## 2021-04-19 RX ORDER — LIDOCAINE HYDROCHLORIDE 20 MG/ML
1 INJECTION, SOLUTION EPIDURAL; INFILTRATION; INTRACAUDAL; PERINEURAL
Status: COMPLETED | OUTPATIENT
Start: 2021-04-19 | End: 2021-04-19

## 2021-04-19 RX ORDER — CLINDAMYCIN HYDROCHLORIDE 300 MG/1
CAPSULE ORAL
COMMUNITY
Start: 2021-04-13 | End: 2021-06-07

## 2021-04-19 RX ADMIN — LIDOCAINE HYDROCHLORIDE 1 ML: 20 INJECTION, SOLUTION EPIDURAL; INFILTRATION; INTRACAUDAL; PERINEURAL at 11:44

## 2021-04-19 RX ADMIN — METHYLPREDNISOLONE ACETATE 80 MG: 80 INJECTION, SUSPENSION INTRA-ARTICULAR; INTRALESIONAL; INTRAMUSCULAR; SOFT TISSUE at 11:44

## 2021-04-19 NOTE — PROGRESS NOTES
"  Patient: Sarika Nugent    YOB: 1970    Medical Record Number: [id number]    Chief Complaints:   Right shoulder pain    History of Present Illness:     50 y.o. female patient who presents with a complaint of right shoulder pain.  She reports that the symptoms first started in December.  She was using a \"Bowflex type\" machine while exercising.  She noticed her pain began after an aggressive workout.  Her pain has persisted since onset.  She was evaluated by Riddhi SANON and referred for a CT scan.  She comes in today for further evaluation and treatment recommendations.  Pain is primarily on the top of the shoulder.  Her typical pain is described as mild, constant, aching.  Pain is moderate to severe, with stabbing and burning with certain reaching and lifting motions, driving causes increased pain when turning the wheel.  Throwing her dog's ball is very painful for her.  She denies any alleviating factors.  Reports pain occasionally radiates down the arm.  She experiences brief, intermittent periods of numbness and tingling in the hand with certain motions.  She denies any alleviating factors.  Patient is right hand dominant.     Allergies:   Allergies   Allergen Reactions   • Clindamycin Swelling and Rash   • Erythromycin Rash and Other (See Comments)     INTERNAL Bleeding   • Fentanyl Itching   • Hydrocodone-Acetaminophen Itching   • Levofloxacin Swelling and Rash   • Sulfamethoxazole-Trimethoprim Swelling and Rash   • Penicillins Rash and Swelling     PT REPORTS CAN TAKE KEFLEX/KEFZOL WITHOUT ANY ISSUES.       Medications:   Home Medications:    Current Outpatient Medications:   •  AIMOVIG 70 MG/ML prefilled syringe, Inject 70 mg under the skin into the appropriate area as directed Every 30 (Thirty) Days. TREATMENT FOR MIGRAINES, Disp: , Rfl:   •  buPROPion XL (WELLBUTRIN XL) 150 MG 24 hr tablet, Take 150 mg by mouth Every Morning., Disp: , Rfl:   •  clindamycin (CLEOCIN) 300 MG capsule, " TAKE 1 CAPSULE BY MOUTH EVERY 8 HOURS FOR 7 DAYS, Disp: , Rfl:   •  linaclotide (LINZESS) 145 MCG capsule capsule, Take 145 mcg by mouth Daily., Disp: , Rfl:   •  tiZANidine (ZANAFLEX) 4 MG tablet, Take 4 mg by mouth At Night As Needed., Disp: , Rfl: 0  •  diclofenac (VOLTAREN) 75 MG EC tablet, Take 1 tablet by mouth 2 (Two) Times a Day. (Patient taking differently: Take 75 mg by mouth 2 (Two) Times a Day. HOLD PRIOR TO SURGERY), Disp: 60 tablet, Rfl: 1  •  meloxicam (Mobic) 15 MG tablet, Take 1 tablet by mouth Daily., Disp: 30 tablet, Rfl: 1  •  oxyCODONE-acetaminophen (PERCOCET) 5-325 MG per tablet, Take 1-2 tablets by mouth Every 4 (Four) Hours As Needed (pain). 1 tab for mild pain, 2 tabs for moderate pain, Disp: 60 tablet, Rfl: 0    Past Medical History:   Diagnosis Date   • Anxiety and depression    • Constipation    • IBS (irritable bowel syndrome)    • Lupus (CMS/HCC)    • Migraine    • PONV (postoperative nausea and vomiting)    • Right knee pain        Past Surgical History:   Procedure Laterality Date   • KNEE ARTHROSCOPY Bilateral    • KNEE ARTHROSCOPY Right 6/2/2020    Procedure: right KNEE ARTHROSCOPY with debridement as needed, partial medial menisectomy, abrasion chondroplasty;  Surgeon: Tam Stewart MD;  Location: [facility];  Service: Orthopedics;  Laterality: Right;   • KNEE ARTHROSCOPY Left 9/4/2020    Procedure: left KNEE ARTHROSCOPY, PARTIAL MEDIAL MENISECTOMY, PLICA EXCISION, ABRASION CHONDROPLASTY;  Surgeon: Tam Stewart MD;  Location: [facility];  Service: Orthopedics;  Laterality: Left;   • KNEE SURGERY     • RHINOPLASTY     • TUBAL ABDOMINAL LIGATION     • WRIST FRACTURE SURGERY Left        Social History     Occupational History   • Occupation: Disabled   Tobacco Use   • Smoking status: Never Smoker   • Smokeless tobacco: Never Used   Vaping Use   • Vaping Use: Never used   Substance and Sexual Activity   • Alcohol use: Not Currently   • Drug use: No   •  "Sexual activity: Defer      Social History     Social History Narrative    1 daughter, 3 sons       Family History   Problem Relation Age of Onset   • Breast cancer Maternal Grandmother         in her 50's   • Brain cancer Maternal Aunt         in her 50's   • Cancer Maternal Cousin         unsure of what kind in her late 40's   • Cervical cancer Maternal Aunt         late 30's/early 40's   • Malig Hyperthermia Neg Hx        Review of Systems:      Constitutional: Denies fever, shaking or chills   Eyes: Denies change in visual acuity   HEENT: Denies nasal congestion or sore throat   Respiratory: Denies cough or shortness of breath   Cardiovascular: Denies chest pain or edema  Endocrine: Denies tremors, palpitations, intolerance of heat or cold, polyuria, polydipsia.  GI: Denies abdominal pain, nausea, vomiting, bloody stools or diarrhea  : Denies frequency, urgency, incontinence, retention, or nocturia.  Musculoskeletal: Denies numbness, tingling or loss of motor function except as above  Integument: Denies rash, lesion or ulceration   Neurologic: Denies headache or focal weakness, deficits  Heme: Denies spontaneous or excessive bleeding, epistaxis, hematuria, melena, fatigue, enlarged or tender lymph nodes.      All other pertinent positives and negatives as noted above in HPI.    Physical Exam:   50 y.o. female  Vitals:    04/19/21 1058   Temp: 96.2 °F (35.7 °C)   Weight: 79.4 kg (175 lb)   Height: 157.5 cm (62\")     General:  Patient is awake and alert.  Appears in no acute distress or discomfort.    Psych:  Affect and demeanor are appropriate.    Eyes:  Conjunctiva and sclera appear grossly normal.  Eyes track well and EOM seem to be intact.    Ears:  No gross abnormalities.  Hearing adequate for the exam.    Dentition:  No gross abnormalities noted.    Cardiovascular:  Regular rate and rhythm.    Lungs:  Good chest expansion.  Breathing unlabored.    Lymph:  No palpable adenopathy about neck or " axilla.    Neck:  Supple.  Normal ROM.  Negative Spurling's for shoulder or arm pain.    Right upper extremity: Skin is benign.  No gross abnormalities on inspection including any atrophy, swellings, or masses.  No palpable masses or adenopathy.  Focal tenderness noted over the acromioclavicular joint.  Shoulder motion is full with forward elevation and external rotation, internal rotation to back pocket.  All motion is very uncomfortable for her.  Unable to assess for instability or strength due to pain and guarded exam.  Positive active compression maneuver which reproduces the patient's typical pain.  Good strength in the rotator cuff, deltoid, biceps, triceps, and .  Intact sensation throughout the arm.  Brisk cap refill.          Radiology:  AP, scapular Y, and axillary views of the right shoulder are ordered by myself and reviewed to evaluate the patient's complaint.  No comparison films are immediately available.  The x-rays show severe acromioclavicular arthritis.  There are no obvious acute abnormalities, lesions, masses, significant glenohumeral degenerative changes, or other concerning findings.  The acromiohumeral interval is normal.  Glenoid version appears normal as well.    The right shoulder CT report is reviewed.  There are no images readily available.  The findings are as follows:    CONCLUSION  1. Moderate acromioclavicular and mild glenohumeral osteoarthritis.  2.  Suboptimal evaluation of the rotator cuff and labrum secondary to the modality.  Consider MRI to further evaluate.    Assessment/Plan:   1.  Chronic right shoulder pain  2.  Right acromioclavicular arthritis    We discussed the findings of her CT based on the result reports.  I explained that  shoulder CT scans provided some useful information, but a MRI is better test to provide detailed evaluation of the rotator cuff, labrum, and other soft tissues of the shoulder.  We discussed treatment options in detail.  I have recommended  that we start with a conservative approach.  With regards to conservative options, we discussed appropriate activity modifications, icing as needed, anti-inflammatories, physical therapy, and injections.  Patient elected for an injection today.  The risks, benefits and alternatives to the injection were thoroughly discussed.  She acknowledged understanding and consented.  Injection was performed as described below.  I have given her a prescription for meloxicam.  The risks of this medication were discussed.  The patient will follow-up with me in 4 weeks for re-evaluation.      FAB Shelton    04/19/2021    CC to Riddhi Lucero APRN    [facility] Joint Arthrocentesis: R acromioclavicular  Date/Time: 4/19/2021 11:44 AM  Consent given by: patient  Site marked: site marked  Timeout: Immediately prior to procedure a time out was called to verify the correct patient, procedure, equipment, support staff and site/side marked as required   Supporting Documentation  Indications: pain   Procedure Details  Location: shoulder - R acromioclavicular  Preparation: Patient was prepped and draped in the usual sterile fashion  Needle size: 25 G  Approach: superior  Medications administered: 80 mg methylPREDNISolone acetate 80 MG/ML; 1 mL lidocaine PF 2% 2 %  Patient tolerance: patient tolerated the procedure well with no immediate complications

## 2021-04-22 ENCOUNTER — OFFICE VISIT (OUTPATIENT)
Dept: SURGERY | Facility: CLINIC | Age: 51
End: 2021-04-22

## 2021-04-22 ENCOUNTER — APPOINTMENT (OUTPATIENT)
Dept: VACCINE CLINIC | Facility: HOSPITAL | Age: 51
End: 2021-04-22

## 2021-04-22 VITALS
RESPIRATION RATE: 17 BRPM | HEIGHT: 62 IN | HEART RATE: 73 BPM | BODY MASS INDEX: 32.2 KG/M2 | SYSTOLIC BLOOD PRESSURE: 121 MMHG | OXYGEN SATURATION: 94 % | WEIGHT: 175 LBS | DIASTOLIC BLOOD PRESSURE: 86 MMHG

## 2021-04-22 DIAGNOSIS — D24.2 FIBROADENOMA OF LEFT BREAST IN FEMALE: Primary | ICD-10-CM

## 2021-04-22 PROCEDURE — 99213 OFFICE O/P EST LOW 20 MIN: CPT | Performed by: SURGERY

## 2021-04-22 NOTE — PROGRESS NOTES
[facility]     Referring Provider: FAB Ryan     Chief complaint: F/u left breast fibroadenoma     HPI:   10/7/19:  Ms. Sarika Nugent is a 47 yo woman, seen at the request of FAB Ryan, for a new diagnosis of left breast fibroadenoma. This was initially detected as an imaging abnormality on routine screening on 8/30/19. She underwent diagnostic imaging, however there was no ultrasound correlate for the 2.9 cm mass seen on mammogram. She subsequently underwent a stereotactic biopsy, which showed a fibroadenoma (I sent her biopsy pathology to Dr. Feng Rabago for a second opinion). Her work-up is detailed in the breast history section below. Prior to the biopsy, she denies any breast lumps, pain, skin changes, or nipple discharge.     She has a past history of a left breast ultrasound-guided biopsy in 2013 which showed fibroadenomatoid change. She has a family history of breast cancer in her maternal grandmother (diagnosed in her 50s). She denies any family history of ovarian cancer.      9/10/20:  She was initially supposed to follow-up with me in March, however that appointment was delayed due to the pandemic. She did undergo a follow-up left MMG & US in March, and the report said that the mass had increased in size to 2.7 cm from 2.5 cm. However the mass had never been previously visualized on ultrasound and initially measured 2.9 mm on her 2019 mammogram. She underwent screening mammogram and follow-up left ultrasound in 9/2020 prior to her appointment, which demonstrated a stable mass. See imaging report details below. She still cannot feel the mass and denies any associated symptoms.    4/22/21, Interval History:  She returns today for scheduled follow-up. She underwent repeat left breast ultrasound prior to her appointment which was stable. She still cannot appreciate the mass and denies any breast related complaints.       Breast history/imaging (updated 9/10/20):    9/6/13,  Screening MMG ([place]):   Scattered fibroglandular densities. There is a round mass seen in the anterior depth region of the left breast at 3:00. There are no masses, suspicious calcifications, or other abnormalities seen in the right breast.   BI-RADS 0: Incomplete.     9/20/13, Left Diagnostic MMG & US ([place]):   MMG:  An oval mass persisted on spot compression in the anterior depth lateral left breast.   US:  At the 3:00 position 3 cm from the nipple a 1.4 cm maximal diameter oval mildly heterogeneously hypoechoic parallel mass was identified which has benign imaging characteristics and likely represents a fibroadenoma. The mass is retrospectively palpable on targeted physical exam performed by myself. The finding correlates nicely with the mammogram. The patient prefers ultrasound guided core needle biopsy.   BI-RADS 4A: Suspicious.     9/26/13, Left Breast, US-Guided Biopsy ([place]):   Left Breast Mass at 3:00, Core Needle Biopsy:   1. Benign Breast Tissue with Fibroadenomatoid Change, Apocrine Metaplasia, and Stromal Fibrosis.   2. No Atypia or Malignancy Identified.  -Concordant.     1/5/15, Screening MMG ([facility]):   Scattered fibroglandular densities. There is a biopsy marker associated with a faint nodular density in the upper outer hemisphere left breast, middle third depth. There is no new dominant nodule, mass or suspicious cluster of microcalcifications.  BI-RADS 2: Benign.     4/20/16, Screening MMG ([facility]):   Biopsy marker associated with a biopsy-proven fibroadenoma in the left breast is again noted. Breast parenchyma is composed of scattered fibroglandular densities. The pattern is unchanged. There is no new dominant nodule, mass, or suspicious cluster of microcalcifications.   BI-RADS 2: Benign.     8/30/19, Screening MMG with Tomosynthesis ([facility]):  Scattered areas of fibroglandular density.  No suspicious mass, area of architectural distortion or suspicious  microcalcification is identified in the right breast. Stable 1 cm isodense nodule in the anterior outer left breast with associated biopsy clip. New or enlarged 2.5 cm isodense nodule in the mid depth slightly outer left breast. No concerning microcalcification or areas of architectural distortion in the left breast.   BI-RADS 0: Incomplete.      9/6/19, Left Diagnostic MMG & Left Breast US ([facility]):   MMG:  There is a well-circumscribed round, partially obscured mass in the superior left breast at the 12:00-1:00 position. This measures 2.9 x 2.4 x 2.7 cm. There no microcalcifications or architectural distortion. The lesion is positioned approximately 7 cm posterior to the nipple. Is been previous biopsy in the lateral left breast.   US:  No suspicious ultrasound findings are identified. No mass or evidence of malignancy. An ultrasound correlates for the rounded mass in the left breast is not clearly identified.   IMPRESSION:   Incomplete mammogram. There is a 2.7 cm well-circumscribed mass in the left breast, presumably at the 12:00 to 1:00 position. It is unusual that no ultrasound correlates is identified. I would recommend a 3-D mammogram to confirm that the mass is truly present in this is not artifact from overlapping fibroglandular tissue. If this remains on the 3-D images, a stereotactic biopsy would be recommended.   BI-RADS 0: Incomplete.     9/10/19, Left Breast, Stereotactic Biopsy ([facility]):   1. Left Breast Tissue, Core Biopsies  Segments of Breast Tissue Containing Focal Sclerosing Adenosis, Very Focal Hyperplasia, Usual Type, and Segments of a Fibroglandular Lesion with Prominent Fibrosis.   Comment: The fibrous component has a locally prominent spindled/ fascicular pattern. Different diagnosis would include a fibroadenoma, as well as a myoepithelioma.   -Concordant.  Dr. Feng Rabago, Pathology Consultation ([facility]):   Breast, Left Core Needle Biopsy  (SP-, 3 Slides):   - Fibroadenoma; Usual Hyperplasia without Atypia.  Comment: I essentially agree with the original pathologist's diagnosis. The fibroadenoma has myofibroblastic stroma, a finding of no clinical significance.     3/9/20, Left Diagnostic MMG with Ki & Left Breast US ([facility]):  MMG:  In the interval a biopsy clip slightly eccentric to a 2.7 x 1.8 cm macrolobular mass central slightly outer left breast, reportedly benign per patient. The mass previously measured 2.5 x 1.5 cm. A 2nd stable biopsy clip in the outer slightly upper left breast from remote breast biopsy. There are no suspicious microcalfications or areas of architectural distortion.  US:  High-resolution focused left breast ultrasound at the 2 o’clock position, 3 cm from the nipple but at a slightly greater depth is a 2.8 x 2 x 1.2 cm macrolobular mass with visualization of the eccentric clip corresponding to the mammographic lesion. A 1 cm parallel heterogeneous solid mass at the 3 o’clock position 5 cm from the nipple corresponds to stable previously biopsied mammographic mass.   BI-RADS: 3 Probably benign. Recommend short term follow-up ultrasound left breast in 6 months.    9/1/20, Screening MMG with Ki & Left Breast US ([facility]):  MMG:  Scattered areas of fibroglandular density. Previously biopsied 2.7 cm oval circumscribed mass in the central left breast, middle depth, is stable in size compared to 3/9/2020, but slightly increased in size compared to 8/30/2019, as described on the previous exam. Previously biopsied 1.2 cm oval circumscribed mass in the outer central left breast, middle depth, is also stable. No suspicious mass, area of architectural distortion or suspicious microcalcification is identified.  US:  Oval circumscribed hypoechoic mass in the left breast at 2:00, 3 cm from the nipple, is difficult to fully characterize due to its depth. It measures 2.8 x 1.2 x 2.1 cm, previously 2.8 x 1.2 x 2.0 cm on 3/9/2020. If this  mass is not surgically excised, recommend continued imaging surveillance.  BI-RADS 2: Benign.    3/2/21, Left Breast US ([facility]):  Oval circumscribed hypoechoic mass deep in the left breast at 2:00, 3 cm from the nipple measures 2.9 x 1.1 x 2.1 cm, previously 2.8 x 1.2 x 2.1 cm on 9/1/2020 and 2.8 x 1.2 x 2.0 cm on 3/9/2020.  BI-RADS 2: Benign.      Review of Systems:  See interval history.       Medications:    Current Outpatient Medications:   •  AIMOVIG 70 MG/ML prefilled syringe, Inject 70 mg under the skin into the appropriate area as directed Every 30 (Thirty) Days. TREATMENT FOR MIGRAINES, Disp: , Rfl:   •  buPROPion XL (WELLBUTRIN XL) 150 MG 24 hr tablet, Take 150 mg by mouth Every Morning., Disp: , Rfl:   •  clindamycin (CLEOCIN) 300 MG capsule, TAKE 1 CAPSULE BY MOUTH EVERY 8 HOURS FOR 7 DAYS, Disp: , Rfl:   •  linaclotide (LINZESS) 145 MCG capsule capsule, Take 145 mcg by mouth Daily., Disp: , Rfl:   •  meloxicam (Mobic) 15 MG tablet, Take 1 tablet by mouth Daily., Disp: 30 tablet, Rfl: 1  •  tiZANidine (ZANAFLEX) 4 MG tablet, Take 4 mg by mouth At Night As Needed., Disp: , Rfl: 0      Allergies   Allergen Reactions   • Clindamycin Swelling and Rash   • Erythromycin Rash and Other (See Comments)     INTERNAL Bleeding   • Fentanyl Itching   • Hydrocodone-Acetaminophen Itching   • Levofloxacin Swelling and Rash   • Sulfamethoxazole-Trimethoprim Swelling and Rash   • Penicillins Rash and Swelling     PT REPORTS CAN TAKE KEFLEX/KEFZOL WITHOUT ANY ISSUES.       Family History   Problem Relation Age of Onset   • Breast cancer Maternal Grandmother         in her 50's   • Brain cancer Maternal Aunt         in her 50's   • Cancer Maternal Cousin         unsure of what kind in her late 40's   • Cervical cancer Maternal Aunt         late 30's/early 40's   • Malig Hyperthermia Neg Hx        PHYSICAL EXAMINATION:   Vitals:    04/22/21 0814   BP: 121/86   Pulse: 73   Resp: 17   SpO2: 94%     ECOG 0 -  Asymptomatic  General: NAD, well appearing  Psych: a&o x 3, normal mood and affect  Eyes: EOMI, no scleral icterus  ENMT: neck supple without masses or thyromegaly, mucus membranes moist  MSK: normal gait, normal ROM in bilateral shoulders  Lymph nodes: no cervical, supraclavicular or axillary lymphadenopathy  Breast: symmetric, large size, grade 1 ptosis, dense tissue bilaterally  Right: No visible abnormalities on inspection while seated, with arms raised or hands on hips. No masses, skin changes, or nipple abnormalities.  Left: No visible abnormalities on inspection while seated, with arms raised or hands on hips. No masses, skin changes, or nipple abnormalities.       I have independently reviewed her imaging and here are my findings:   Stable biopsy-proven fibroadenoma in the retroareolar left breast.      Assessment:  50 y.o. F with an asymptomatic left breast fibroadenoma, which has been stable on imaging since 2019.    Discussion:  This mass has not grown in 2 years and there is no need for any additional surveillance or excision.    Plan:  -F/u with me as necessary. She will be due for annual screening mammogram in 9/2021.  -She was instructed to call with any questions, concerns or changes on BSE.    Letty Gasca MD      CC:  FAB Ryan

## 2021-04-26 ENCOUNTER — IMMUNIZATION (OUTPATIENT)
Dept: VACCINE CLINIC | Facility: HOSPITAL | Age: 51
End: 2021-04-26

## 2021-04-26 ENCOUNTER — APPOINTMENT (OUTPATIENT)
Dept: VACCINE CLINIC | Facility: HOSPITAL | Age: 51
End: 2021-04-26

## 2021-04-26 PROCEDURE — 0002A: CPT | Performed by: INTERNAL MEDICINE

## 2021-04-26 PROCEDURE — 91300 HC SARSCOV02 VAC 30MCG/0.3ML IM: CPT | Performed by: INTERNAL MEDICINE

## 2021-04-28 ENCOUNTER — OFFICE VISIT (OUTPATIENT)
Dept: ORTHOPEDIC SURGERY | Facility: CLINIC | Age: 51
End: 2021-04-28

## 2021-04-28 VITALS — TEMPERATURE: 97.5 F | HEIGHT: 62 IN | BODY MASS INDEX: 32.2 KG/M2 | WEIGHT: 175 LBS

## 2021-04-28 DIAGNOSIS — G89.29 CHRONIC RIGHT SHOULDER PAIN: Primary | ICD-10-CM

## 2021-04-28 DIAGNOSIS — M25.511 CHRONIC RIGHT SHOULDER PAIN: Primary | ICD-10-CM

## 2021-04-28 PROCEDURE — 99213 OFFICE O/P EST LOW 20 MIN: CPT | Performed by: ORTHOPAEDIC SURGERY

## 2021-04-28 RX ORDER — DIAZEPAM 5 MG/1
5 TABLET ORAL
Qty: 1 TABLET | Refills: 0 | Status: SHIPPED | OUTPATIENT
Start: 2021-04-28 | End: 2021-06-07

## 2021-04-29 NOTE — PROGRESS NOTES
Patient:Sarika Nugent    YOB: 1970    Medical Record Number:[id number]    Chief Complaints: Right shoulder pain    History of Present Illness:     50 y.o. female patient who presents for her right shoulder.  The shoulder for started bothering her around December.  She does not recall a specific injury but she thinks that this was a result of working out with her new Bowflex machine.  She saw Maame for this issue just a few days ago and had an injection.  The injection did not help at all.  She reports constant rather diffuse pain.  She has pain laterally, posterior and into her armpit.  The pain is worse with activity.  She describes the pain as burning and aching.  She says the shoulder periodically feels like it gives way.    Allergies:  Allergies   Allergen Reactions   • Clindamycin Swelling and Rash   • Erythromycin Rash and Other (See Comments)     INTERNAL Bleeding   • Fentanyl Itching   • Hydrocodone-Acetaminophen Itching   • Levofloxacin Swelling and Rash   • Sulfamethoxazole-Trimethoprim Swelling and Rash   • Penicillins Rash and Swelling     PT REPORTS CAN TAKE KEFLEX/KEFZOL WITHOUT ANY ISSUES.       Home Medications:    Current Outpatient Medications:   •  AIMOVIG 70 MG/ML prefilled syringe, Inject 70 mg under the skin into the appropriate area as directed Every 30 (Thirty) Days. TREATMENT FOR MIGRAINES, Disp: , Rfl:   •  buPROPion XL (WELLBUTRIN XL) 150 MG 24 hr tablet, Take 150 mg by mouth Every Morning., Disp: , Rfl:   •  clindamycin (CLEOCIN) 300 MG capsule, TAKE 1 CAPSULE BY MOUTH EVERY 8 HOURS FOR 7 DAYS, Disp: , Rfl:   •  linaclotide (LINZESS) 145 MCG capsule capsule, Take 145 mcg by mouth Daily., Disp: , Rfl:   •  meloxicam (Mobic) 15 MG tablet, Take 1 tablet by mouth Daily., Disp: 30 tablet, Rfl: 1  •  tiZANidine (ZANAFLEX) 4 MG tablet, Take 4 mg by mouth At Night As Needed., Disp: , Rfl: 0  •  diazePAM (Valium) 5 MG tablet, Take 1 tablet by mouth 60 Minutes Prior to Surgery.,  Disp: 1 tablet, Rfl: 0    Past Medical History:   Diagnosis Date   • Anxiety and depression    • Constipation    • IBS (irritable bowel syndrome)    • Lupus (CMS/HCC)    • Migraine    • PONV (postoperative nausea and vomiting)    • Right knee pain        Past Surgical History:   Procedure Laterality Date   • KNEE ARTHROSCOPY Bilateral    • KNEE ARTHROSCOPY Right 6/2/2020    Procedure: right KNEE ARTHROSCOPY with debridement as needed, partial medial menisectomy, abrasion chondroplasty;  Surgeon: Tam Stewart MD;  Location: [facility] OR [facility] [place];  Service: Orthopedics;  Laterality: Right;   • KNEE ARTHROSCOPY Left 9/4/2020    Procedure: left KNEE ARTHROSCOPY, PARTIAL MEDIAL MENISECTOMY, PLICA EXCISION, ABRASION CHONDROPLASTY;  Surgeon: Tam Stewart MD;  Location: [facility] OR [facility] [place];  Service: Orthopedics;  Laterality: Left;   • KNEE SURGERY     • RHINOPLASTY     • TUBAL ABDOMINAL LIGATION     • WRIST FRACTURE SURGERY Left        Social History     Occupational History   • Occupation: Disabled   Tobacco Use   • Smoking status: Never Smoker   • Smokeless tobacco: Never Used   Vaping Use   • Vaping Use: Never used   Substance and Sexual Activity   • Alcohol use: Not Currently   • Drug use: No   • Sexual activity: Defer      Social History     Social History Narrative    1 daughter, 3 sons       Family History   Problem Relation Age of Onset   • Breast cancer Maternal Grandmother         in her 50's   • Brain cancer Maternal Aunt         in her 50's   • Cancer Maternal Cousin         unsure of what kind in her late 40's   • Cervical cancer Maternal Aunt         late 30's/early 40's   • Malig Hyperthermia Neg Hx        Review of Systems:      Constitutional: Denies fever, shaking or chills   Eyes: Denies change in visual acuity   HEENT: Denies nasal congestion or sore throat   Respiratory: Denies cough or shortness of breath   Cardiovascular: Denies chest pain or edema  Endocrine: Denies tremors,  "palpitations, intolerance of heat or cold, polyuria, polydipsia.  GI: Denies abdominal pain, nausea, vomiting, bloody stools or diarrhea  : Denies frequency, urgency, incontinence, retention, or nocturia.  Musculoskeletal: Denies numbness, tingling or loss of motor function except as above  Integument: Denies rash, lesion or ulceration   Neurologic: Denies headache or focal weakness, deficits  Heme: Denies spontaneous or excessive bleeding, epistaxis, hematuria, melena, fatigue, enlarged or tender lymph nodes.      All other pertinent positives and negatives as noted above in HPI.    Physical Exam:50 y.o. female  Vitals:    04/28/21 1351   Temp: 97.5 °F (36.4 °C)   Weight: 79.4 kg (175 lb)   Height: 157.5 cm (62\")       General:  Patient is awake and alert.  Appears in no acute distress or discomfort.    Psych:  Affect and demeanor are appropriate.    Extremities: Right shoulder is examined.  Skin is benign.  No atrophy, swelling or masses.  Moderate tenderness over the acromioclavicular joint.  Moderate tenderness over the biceps as well.  She has full motion but she struggles at extremes of elevation and internal rotation.  Positive Neer, Clayton, speeds, [place]ri[place] and active compression maneuvers.  Due to her pain, had a difficult time assessing her rotator cuff strength.  With her elbow at the side, she can resist internal and external rotation with 5 out of 5 strength and little discomfort though.  Normal motor and sensory function in her wrist and hand.  Brisk capillary refill.    Imaging: Previous x-rays including three views right shoulder are reviewed.  She has mild acromioclavicular arthritis but no other significant findings are noted.  An outside MRI report for the right shoulder is reviewed.  This is a noncontrasted study from [facility].  We do not have the images.  The report describes that they were only able to complete one of the eight sequences due to severe claustrophobia and patient " "intolerance.  There is description of a \"tiny\" articular sided rotator cuff tear and a questionable superior labral tear.  Mild acromioclavicular arthritis is also mentioned.    Assessment/Plan: Right shoulder pain, suspected low-grade partial-thickness rotator cuff tear, degenerative glenoid labral tear and acromioclavicular arthritis    She tells me she is interested in getting this problem \"fixed\".  I told her that I recommend a better MRI off of which to base our decision making.  I advised her against proceeding based on the very limited study that she had at [facility].  I recommend an MR arthrogram to better evaluate her rotator cuff and labrum.  I have agreed to give her a prescription for Valium to take prior to the procedure.  Risk of the medicine were discussed.  I told her I will call her once I see the results of the MR arthrogram.  We will come up with a plan pending review of that study.    Eusebio Pa MD    04/28/2021    "

## 2021-05-06 ENCOUNTER — TELEPHONE (OUTPATIENT)
Dept: ORTHOPEDIC SURGERY | Facility: CLINIC | Age: 51
End: 2021-05-06

## 2021-05-06 NOTE — TELEPHONE ENCOUNTER
I spoke with her and gave her the MRI results.  She is going to follow-up in the office to discuss further.  She already has an appointment for May 19.

## 2021-05-19 ENCOUNTER — OFFICE VISIT (OUTPATIENT)
Dept: ORTHOPEDIC SURGERY | Facility: CLINIC | Age: 51
End: 2021-05-19

## 2021-05-19 VITALS — WEIGHT: 170 LBS | HEIGHT: 63 IN | TEMPERATURE: 96.6 F | BODY MASS INDEX: 30.12 KG/M2

## 2021-05-19 DIAGNOSIS — G89.29 CHRONIC RIGHT SHOULDER PAIN: Primary | ICD-10-CM

## 2021-05-19 DIAGNOSIS — M25.511 CHRONIC RIGHT SHOULDER PAIN: Primary | ICD-10-CM

## 2021-05-19 PROCEDURE — 99214 OFFICE O/P EST MOD 30 MIN: CPT | Performed by: ORTHOPAEDIC SURGERY

## 2021-05-20 RX ORDER — CEFAZOLIN SODIUM 2 G/100ML
2 INJECTION, SOLUTION INTRAVENOUS ONCE
Status: CANCELLED | OUTPATIENT
Start: 2021-06-15 | End: 2021-05-20

## 2021-05-20 NOTE — PROGRESS NOTES
"  Medical Record Number: [id number]    Chief Complaints: Follow-up regarding right shoulder pain    History of Present Illness:     50 y.o. female patient who presents for follow-up of the right shoulder.  Patient reports persistent pain and symptoms.  She reports that the shoulder is \"miserable\".  She especially notices the pain when trying to throw and with certain reaching and lifting movements.  Conservative treatment has been ineffective thus far.  The injection she had did not help at all.  She recently got an MRI and presents today to review that study and discuss further options.    Allergies:   Allergies   Allergen Reactions   • Clindamycin Swelling and Rash   • Erythromycin Rash and Other (See Comments)     INTERNAL Bleeding   • Fentanyl Itching   • Hydrocodone-Acetaminophen Itching   • Levofloxacin Swelling and Rash   • Sulfamethoxazole-Trimethoprim Swelling and Rash   • Penicillins Rash and Swelling     PT REPORTS CAN TAKE KEFLEX/KEFZOL WITHOUT ANY ISSUES.       Home Medications:    Current Outpatient Medications:   •  AIMOVIG 70 MG/ML prefilled syringe, Inject 70 mg under the skin into the appropriate area as directed Every 30 (Thirty) Days. TREATMENT FOR MIGRAINES, Disp: , Rfl:   •  buPROPion XL (WELLBUTRIN XL) 150 MG 24 hr tablet, Take 150 mg by mouth Every Morning., Disp: , Rfl:   •  clindamycin (CLEOCIN) 300 MG capsule, TAKE 1 CAPSULE BY MOUTH EVERY 8 HOURS FOR 7 DAYS, Disp: , Rfl:   •  linaclotide (LINZESS) 145 MCG capsule capsule, Take 145 mcg by mouth Daily., Disp: , Rfl:   •  meloxicam (Mobic) 15 MG tablet, Take 1 tablet by mouth Daily., Disp: 30 tablet, Rfl: 1  •  tiZANidine (ZANAFLEX) 4 MG tablet, Take 4 mg by mouth At Night As Needed., Disp: , Rfl: 0  •  diazePAM (Valium) 5 MG tablet, Take 1 tablet by mouth 60 Minutes Prior to Surgery., Disp: 1 tablet, Rfl: 0    Past Medical History:   Diagnosis Date   • Anxiety and depression    • Constipation    • IBS (irritable bowel syndrome)    • Lupus " (CMS/[facility])    • Migraine    • PONV (postoperative nausea and vomiting)    • Right knee pain        Past Surgical History:   Procedure Laterality Date   • KNEE ARTHROSCOPY Bilateral    • KNEE ARTHROSCOPY Right 6/2/2020    Procedure: right KNEE ARTHROSCOPY with debridement as needed, partial medial menisectomy, abrasion chondroplasty;  Surgeon: Tam Stewart MD;  [place] [facility] [place] [facility] [place]  Service: Orthopedics;  Laterality: Right;   • KNEE ARTHROSCOPY Left 9/4/2020    Procedure: left KNEE ARTHROSCOPY, PARTIAL MEDIAL MENISECTOMY, PLICA EXCISION, ABRASION CHONDROPLASTY;  Surgeon: Tam Stewart MD;  Location: [facility] OR [facility] [place];  Service: Orthopedics;  Laterality: Left;   • KNEE SURGERY     • RHINOPLASTY     • TUBAL ABDOMINAL LIGATION     • WRIST FRACTURE SURGERY Left        Social History     Occupational History   • Occupation: Disabled   Tobacco Use   • Smoking status: Never Smoker   • Smokeless tobacco: Never Used   Vaping Use   • Vaping Use: Never used   Substance and Sexual Activity   • Alcohol use: Not Currently   • Drug use: No   • Sexual activity: Defer      Social History     Social History Narrative    1 daughter, 3 sons       Family History   Problem Relation Age of Onset   • Breast cancer Maternal Grandmother         in her 50's   • Brain cancer Maternal Aunt         in her 50's   • Cancer Maternal Cousin         unsure of what kind in her late 40's   • Cervical cancer Maternal Aunt         late 30's/early 40's   • Malig Hyperthermia Neg Hx        Review of Systems:      Constitutional: Denies fever, shaking or chills   Eyes: Denies change in visual acuity   HEENT: Denies nasal congestion or sore throat   Respiratory: Denies cough or shortness of breath   Cardiovascular: Denies chest pain or edema  Endocrine: Denies tremors, palpitations, intolerance of heat or cold, polyuria, polydipsia.  GI: Denies abdominal pain, nausea, vomiting, bloody stools or diarrhea  : Denies frequency, urgency,  "incontinence, retention, or nocturia.  Musculoskeletal: Denies numbness, tingling or loss of motor function except as above  Integument: Denies rash, lesion or ulceration   Neurologic: Denies headache or focal weakness, deficits  Heme: Denies spontaneous or excessive bleeding, epistaxis, hematuria, melena, fatigue, enlarged or tender lymph nodes.      All other pertinent positives and negatives as noted above in HPI.    Physical Exam:   50 y.o. female  Vitals:    05/19/21 1542   Temp: 96.6 °F (35.9 °C)   TempSrc: Temporal   Weight: 77.1 kg (170 lb)   Height: 160 cm (63\")     General:  Patient is awake and alert.  Appears in no acute distress or discomfort.    Psych:  Affect and demeanor are appropriate.    Eyes:  Conjunctiva and sclera appear grossly normal.  Eyes track well and EOM seem to be intact.    Ears:  No gross abnormalities.  Hearing adequate for the exam.    Cardiovascular:  Regular rate and rhythm.    Lungs:  Good chest expansion.  Breathing unlabored.    Extremities: Right shoulder is examined.  Her exam remains very guarded.  Skin is benign.  Her active motion is limited and painful but she has full passive motion and there is no evident adhesive capsulitis.  She has moderate acromioclavicular joint tenderness.  Positive active compression maneuver.  Positive Neer, Clayton, speeds and [place]'s maneuvers as well.  Full shoulder motion.  She has pain when trying to perform resistive testing of her rotator cuff and I could not really get a good assessment of her strength.  Normal motor and sensory function in the lower arm and hand.  Palpable radial pulse.  Brisk capillary refill.    Imaging: MRI of the right shoulder is reviewed along with the associated report.  She has a superior labral tear.  She has mild to moderate acromioclavicular arthritis with mild subacromial/subdeltoid bursitis.  There is no rotator cuff tear.  She does have mild rotator cuff tendinopathy.    Assessment/Plan:  Right glenoid " "labral tear, biceps tendinopathy, subacromial/subdeltoid bursitis and acromioclavicular arthritis    We discussed the natural history of this condition and all available treatment options, both surgical and nonsurgical.  I explained that I do not see anything on her MRI that is an absolute indication for surgery at this point.  We discussed further conservative treatment.  She is not interested in that.  She tells me that she just \"wants it fixed\".  I tried to explain to her that it is not quite so simple as that and that surgery to address this would involve a lengthy recovery.  I also explained that physical therapy will be critical in achieving a good outcome from any surgery.  She says that she is willing to do that therapy postoperatively.    We had a thorough discussion regarding the risks, benefits and alternatives to an arthroscopic labral debridement versus repair, subacromial decompression, distal clavicle excision and possible biceps tenotomy versus tenodesis and indicated procedures.  I explained that surgical risks include infection, hematoma, persistent pain and/or loss of motion, iatrogenic nerve and/or blood vessel injury resulting in permanent weakness, numbness or dysfunction, RSD, DVT, PE, positioning related neuropraxia, and anesthesia related complications resulting in death.  We discussed the need to address any rotator cuff, labral and/or biceps pathology, if found at the time of the surgery.  I explained to patient that I would certainly plan to address this in the same surgical setting if we were to identify any unexpected pathology.  We discussed the risk associated with this including possible anchor related complications including failure of fixation, loosening, cutout of the anchors, chondrolysis, re-tear necessitating revision surgery.  We also discussed a possible tenotomy versus tenodesis of the biceps, just depending on where the pathology is located.  We discussed the fact that if " the biceps demonstrates significant pathology in the groove that I would plan to perform a tenotomy which could result in lebron deformity or persistent pain, weakness or cramping.  We also discussed possible risks with a tenodesis as well including screw related complications including cutout, chondrolysis, failure of fixation with re-tear of the biceps, fracture and possible iatrogenic nerve injury.  The patient voiced understanding of the risks, benefits, and alternative forms of treatment that were discussed and the patient consents to proceed.  She has a lot more pain on exam than what I would expect for her pathology and I told her that I certainly cannot guarantee that this will cure her of all of her pain.  No guarantees were given regarding the outcome of this proposed procedure.  I did answer all the questions posed by the patient in detail.  She consents to proceed.      Eusebio Pa MD    05/19/2021

## 2021-05-21 PROBLEM — M25.511 CHRONIC RIGHT SHOULDER PAIN: Status: ACTIVE | Noted: 2021-05-21

## 2021-05-21 PROBLEM — G89.29 CHRONIC RIGHT SHOULDER PAIN: Status: ACTIVE | Noted: 2021-05-21

## 2021-06-02 ENCOUNTER — TRANSCRIBE ORDERS (OUTPATIENT)
Dept: PREADMISSION TESTING | Facility: HOSPITAL | Age: 51
End: 2021-06-02

## 2021-06-07 ENCOUNTER — PRE-ADMISSION TESTING (OUTPATIENT)
Dept: PREADMISSION TESTING | Facility: HOSPITAL | Age: 51
End: 2021-06-07

## 2021-06-07 VITALS
SYSTOLIC BLOOD PRESSURE: 125 MMHG | DIASTOLIC BLOOD PRESSURE: 85 MMHG | TEMPERATURE: 98.8 F | HEIGHT: 64 IN | WEIGHT: 173.5 LBS | BODY MASS INDEX: 29.62 KG/M2 | RESPIRATION RATE: 18 BRPM | OXYGEN SATURATION: 98 % | HEART RATE: 90 BPM

## 2021-06-07 LAB
ANION GAP SERPL CALCULATED.3IONS-SCNC: 12 MMOL/L (ref 5–15)
BUN SERPL-MCNC: 9 MG/DL (ref 6–20)
BUN/CREAT SERPL: 9.4 (ref 7–25)
CALCIUM SPEC-SCNC: 9.2 MG/DL (ref 8.6–10.5)
CHLORIDE SERPL-SCNC: 106 MMOL/L (ref 98–107)
CO2 SERPL-SCNC: 22 MMOL/L (ref 22–29)
CREAT SERPL-MCNC: 0.96 MG/DL (ref 0.57–1)
DEPRECATED RDW RBC AUTO: 44.7 FL (ref 37–54)
ERYTHROCYTE [DISTWIDTH] IN BLOOD BY AUTOMATED COUNT: 13.9 % (ref 12.3–15.4)
GFR SERPL CREATININE-BSD FRML MDRD: 62 ML/MIN/1.73
GLUCOSE SERPL-MCNC: 99 MG/DL (ref 65–99)
HCT VFR BLD AUTO: 42.3 % (ref 34–46.6)
HGB BLD-MCNC: 14.3 G/DL (ref 12–15.9)
MCH RBC QN AUTO: 29.7 PG (ref 26.6–33)
MCHC RBC AUTO-ENTMCNC: 33.8 G/DL (ref 31.5–35.7)
MCV RBC AUTO: 87.9 FL (ref 79–97)
PLATELET # BLD AUTO: 277 10*3/MM3 (ref 140–450)
PMV BLD AUTO: 10.1 FL (ref 6–12)
POTASSIUM SERPL-SCNC: 3.5 MMOL/L (ref 3.5–5.2)
RBC # BLD AUTO: 4.81 10*6/MM3 (ref 3.77–5.28)
SODIUM SERPL-SCNC: 140 MMOL/L (ref 136–145)
WBC # BLD AUTO: 8.4 10*3/MM3 (ref 3.4–10.8)

## 2021-06-07 PROCEDURE — 36415 COLL VENOUS BLD VENIPUNCTURE: CPT

## 2021-06-07 PROCEDURE — 80048 BASIC METABOLIC PNL TOTAL CA: CPT

## 2021-06-07 PROCEDURE — 93005 ELECTROCARDIOGRAM TRACING: CPT

## 2021-06-07 PROCEDURE — 85027 COMPLETE CBC AUTOMATED: CPT

## 2021-06-07 PROCEDURE — 93010 ELECTROCARDIOGRAM REPORT: CPT | Performed by: INTERNAL MEDICINE

## 2021-06-07 NOTE — DISCHARGE INSTRUCTIONS
ARRIVE DAY OF SURGERY AS NOTIFIED THE DAY BEFORE TO [facility]        Take the following medications the morning of surgery:  NONE      If you are on prescription narcotic pain medication to control your pain you may also take that medication the morning of surgery.    General Instructions:  • Do not eat solid food after midnight the night before surgery.  • You may drink clear liquids day of surgery but must stop at least one hour before your hospital arrival time.  • It is beneficial for you to have a clear drink that contains carbohydrates the day of surgery.  We suggest a 12 to 20 ounce bottle of Gatorade or Powerade for non-diabetic patients or a 12 to 20 ounce bottle of G2 or Powerade Zero for diabetic patients. (Pediatric patients, are not advised to drink a 12 to 20 ounce carbohydrate drink)    Clear liquids are liquids you can see through.  Nothing red in color.     Plain water                               Sports drinks  Sodas                                   Gelatin (Jell-O)  Fruit juices without pulp such as white grape juice and apple juice  Popsicles that contain no fruit or yogurt  Tea or coffee (no cream or milk added)  Gatorade / Powerade  G2 / Powerade Zero    • Infants may have breast milk up to four hours before surgery.  • Infants drinking formula may drink formula up to six hours before surgery.   • Patients who avoid smoking, chewing tobacco and alcohol for 4 weeks prior to surgery have a reduced risk of post-operative complications.  Quit smoking as many days before surgery as you can.  • Do not smoke, use chewing tobacco or drink alcohol the day of surgery.   • If applicable bring your C-PAP/ BI-PAP machine.  • Bring any papers given to you in the doctor’s office.  • Wear clean comfortable clothes.  • Do not wear contact lenses, false eyelashes or make-up.  Bring a case for your glasses.   • Bring crutches or walker if applicable.  • Remove all piercings.  Leave jewelry and any other  valuables at home.  • Hair extensions with metal clips must be removed prior to surgery.  • The Pre-Admission Testing nurse will instruct you to bring medications if unable to obtain an accurate list in Pre-Admission Testing.            Preventing a Surgical Site Infection:  • For 2 to 3 days before surgery, avoid shaving with a razor because the razor can irritate skin and make it easier to develop an infection.    • Any areas of open skin can increase the risk of a post-operative wound infection by allowing bacteria to enter and travel throughout the body.  Notify your surgeon if you have any skin wounds / rashes even if it is not near the expected surgical site.  The area will need assessed to determine if surgery should be delayed until it is healed.  • The night prior to surgery shower using a fresh bar of anti-bacterial soap (such as Dial) and clean washcloth.  Sleep in a clean bed with clean clothing.  Do not allow pets to sleep with you.  • Shower on the morning of surgery using a fresh bar of anti-bacterial soap (such as Dial) and clean washcloth.  Dry with a clean towel and dress in clean clothing.  • Ask your surgeon if you will be receiving antibiotics prior to surgery.  • Make sure you, your family, and all healthcare providers clean their hands with soap and water or an alcohol based hand  before caring for you or your wound.    Day of surgery:  Your arrival time is approximately two hours before your scheduled surgery time.  Upon arrival, a Pre-op nurse and Anesthesiologist will review your health history, obtain vital signs, and answer questions you may have.  The only belongings needed at this time will be a list of your home medications and if applicable your C-PAP/BI-PAP machine.  A Pre-op nurse will start an IV and you may receive medication in preparation for surgery, including something to help you relax.     Please be aware that surgery does come with discomfort.  We want to make  every effort to control your discomfort so please discuss any uncontrolled symptoms with your nurse.   Your doctor will most likely have prescribed pain medications.      If you are going home after surgery you will receive individualized written care instructions before being discharged.  A responsible adult must drive you to and from the hospital on the day of your surgery and stay with you for 24 hours.  Discharge prescriptions can be filled by the hospital pharmacy during regular pharmacy hours.  If you are having surgery late in the day/evening your prescription may be e-prescribed to your pharmacy.  Please verify your pharmacy hours or chose a 24 hour pharmacy to avoid not having access to your prescription because your pharmacy has closed for the day.    If you are staying overnight following surgery, you will be transported to your hospital room following the recovery period.  [facility] has all private rooms.    If you have any questions please call Pre-Admission Testing at [phone].  Deductibles and co-payments are collected on the day of service. Please be prepared to pay the required co-pay, deductible or deposit on the day of service as defined by your plan.    Patient Education for Self-Quarantine Process    Following your COVID testing, we strongly recommend that you do not leave your home after you have been tested for COVID except to get medical care. This includes not going to work, school or to public areas.  If this is not possible for you to do please limit your activities to only required outings.  Be sure to wear a mask when you are with other people, practice social distancing and wash your hands frequently.      The following items provide additional details to keep you safe.  • Wash your hands with soap and water frequently for at least 20 seconds.   • Avoid touching your eyes, nose and mouth with unwashed hands.  • Do not share anything - utensils, towels, food from the  same bowl.   • Have your own utensils, drinking glass, dishes, towels and bedding.   • Do not have visitors.   • Do use FaceTime to stay in touch with family and friends.  • You should stay in a specific room away from others if possible.   • Stay at least 6 feet away from others in the home if you cannot have a dedicated room to yourself.   • Do not snuggle with your pet. While the CDC says there is no evidence that pets can spread COVID-19 or be infected from humans, it is probably best to avoid “petting, snuggling, being kissed or licked and sharing food (during self-quarantine)”, according to the CDC.   • Sanitize household surfaces daily. Include all high touch areas (door handles, light switches, phones, countertops, etc.)  • Do not share a bathroom with others, if possible.   • Wear a mask around others in your home if you are unable to stay in a separate room or 6 feet apart. If  you are unable to wear a mask, have your family member wear a mask if they must be within 6 feet of you.   Call your surgeon immediately if you experience any of the following symptoms:  • Sore Throat  • Shortness of Breath or difficulty breathing  • Cough  • Chills  • Body soreness or muscle pain  • Headache  • Fever  • New loss of taste or smell  • Do not arrive for your surgery ill.  Your procedure will need to be rescheduled to another time.  You will need to call your physician before the day of surgery to avoid any unnecessary exposure to hospital staff as well as other patients.

## 2021-06-08 LAB — QT INTERVAL: 367 MS

## 2021-06-12 ENCOUNTER — LAB (OUTPATIENT)
Dept: LAB | Facility: HOSPITAL | Age: 51
End: 2021-06-12

## 2021-06-12 LAB — SARS-COV-2 ORF1AB RESP QL NAA+PROBE: NOT DETECTED

## 2021-06-12 PROCEDURE — C9803 HOPD COVID-19 SPEC COLLECT: HCPCS

## 2021-06-12 PROCEDURE — U0004 COV-19 TEST NON-CDC HGH THRU: HCPCS

## 2021-06-15 ENCOUNTER — ANESTHESIA (OUTPATIENT)
Dept: PERIOP | Facility: HOSPITAL | Age: 51
End: 2021-06-15

## 2021-06-15 ENCOUNTER — HOSPITAL ENCOUNTER (OUTPATIENT)
Facility: HOSPITAL | Age: 51
Setting detail: HOSPITAL OUTPATIENT SURGERY
Discharge: HOME | End: 2021-06-15
Attending: ORTHOPAEDIC SURGERY | Admitting: ORTHOPAEDIC SURGERY

## 2021-06-15 ENCOUNTER — ANESTHESIA EVENT (OUTPATIENT)
Dept: PERIOP | Facility: HOSPITAL | Age: 51
End: 2021-06-15

## 2021-06-15 VITALS
RESPIRATION RATE: 16 BRPM | TEMPERATURE: 97.6 F | DIASTOLIC BLOOD PRESSURE: 91 MMHG | OXYGEN SATURATION: 97 % | HEART RATE: 73 BPM | SYSTOLIC BLOOD PRESSURE: 131 MMHG

## 2021-06-15 DIAGNOSIS — G89.29 CHRONIC RIGHT SHOULDER PAIN: ICD-10-CM

## 2021-06-15 DIAGNOSIS — M25.511 CHRONIC RIGHT SHOULDER PAIN: ICD-10-CM

## 2021-06-15 LAB
B-HCG UR QL: NEGATIVE
INTERNAL NEGATIVE CONTROL: NORMAL
INTERNAL POSITIVE CONTROL: NORMAL
Lab: NORMAL

## 2021-06-15 PROCEDURE — 25010000002 HYDROMORPHONE 1 MG/ML SOLUTION: Performed by: NURSE ANESTHETIST, CERTIFIED REGISTERED

## 2021-06-15 PROCEDURE — 25010000002 MIDAZOLAM PER 1 MG: Performed by: ANESTHESIOLOGY

## 2021-06-15 PROCEDURE — C9290 INJ, BUPIVACAINE LIPOSOME: HCPCS | Performed by: ANESTHESIOLOGY

## 2021-06-15 PROCEDURE — 76942 ECHO GUIDE FOR BIOPSY: CPT | Performed by: ORTHOPAEDIC SURGERY

## 2021-06-15 PROCEDURE — C1713 ANCHOR/SCREW BN/BN,TIS/BN: HCPCS | Performed by: ORTHOPAEDIC SURGERY

## 2021-06-15 PROCEDURE — 25010000002 PROPOFOL 10 MG/ML EMULSION: Performed by: NURSE ANESTHETIST, CERTIFIED REGISTERED

## 2021-06-15 PROCEDURE — 25010000002 EPINEPHRINE PER 0.1 MG: Performed by: ORTHOPAEDIC SURGERY

## 2021-06-15 PROCEDURE — 29824 SHO ARTHRS SRG DSTL CLAVICLC: CPT | Performed by: ORTHOPAEDIC SURGERY

## 2021-06-15 PROCEDURE — 25010000002 DEXAMETHASONE PER 1 MG: Performed by: NURSE ANESTHETIST, CERTIFIED REGISTERED

## 2021-06-15 PROCEDURE — 25010000002 ONDANSETRON PER 1 MG: Performed by: NURSE ANESTHETIST, CERTIFIED REGISTERED

## 2021-06-15 PROCEDURE — 25010000003 BUPIVACAINE LIPOSOME 1.3 % SUSPENSION: Performed by: ANESTHESIOLOGY

## 2021-06-15 PROCEDURE — 25010000002 FENTANYL CITRATE (PF) 50 MCG/ML SOLUTION: Performed by: NURSE ANESTHETIST, CERTIFIED REGISTERED

## 2021-06-15 PROCEDURE — 25010000003 CEFAZOLIN IN DEXTROSE 2-4 GM/100ML-% SOLUTION: Performed by: ORTHOPAEDIC SURGERY

## 2021-06-15 PROCEDURE — C1889 IMPLANT/INSERT DEVICE, NOC: HCPCS | Performed by: ORTHOPAEDIC SURGERY

## 2021-06-15 PROCEDURE — 25010000002 HYDROMORPHONE PER 4 MG: Performed by: NURSE ANESTHETIST, CERTIFIED REGISTERED

## 2021-06-15 PROCEDURE — 29826 SHO ARTHRS SRG DECOMPRESSION: CPT | Performed by: ORTHOPAEDIC SURGERY

## 2021-06-15 PROCEDURE — 81025 URINE PREGNANCY TEST: CPT | Performed by: ANESTHESIOLOGY

## 2021-06-15 PROCEDURE — 29828 SHO ARTHRS SRG BICP TENODSIS: CPT | Performed by: ORTHOPAEDIC SURGERY

## 2021-06-15 DEVICE — SUT FIBERLOOP NO2 W/STR NDL 20IN BLU: Type: IMPLANTABLE DEVICE | Site: SHOULDER | Status: FUNCTIONAL

## 2021-06-15 DEVICE — SUT/ANCH BIOCOMP SWIVELOCK TENODESIS 7X19.1: Type: IMPLANTABLE DEVICE | Site: SHOULDER | Status: FUNCTIONAL

## 2021-06-15 RX ORDER — OXYCODONE AND ACETAMINOPHEN 10; 325 MG/1; MG/1
1 TABLET ORAL EVERY 4 HOURS PRN
Status: DISCONTINUED | OUTPATIENT
Start: 2021-06-15 | End: 2021-06-15 | Stop reason: HOSPADM

## 2021-06-15 RX ORDER — SODIUM CHLORIDE, SODIUM LACTATE, POTASSIUM CHLORIDE, CALCIUM CHLORIDE 600; 310; 30; 20 MG/100ML; MG/100ML; MG/100ML; MG/100ML
9 INJECTION, SOLUTION INTRAVENOUS CONTINUOUS
Status: DISCONTINUED | OUTPATIENT
Start: 2021-06-15 | End: 2021-06-15 | Stop reason: HOSPADM

## 2021-06-15 RX ORDER — PROMETHAZINE HYDROCHLORIDE 25 MG/1
25 TABLET ORAL ONCE AS NEEDED
Status: DISCONTINUED | OUTPATIENT
Start: 2021-06-15 | End: 2021-06-15 | Stop reason: HOSPADM

## 2021-06-15 RX ORDER — PROPOFOL 10 MG/ML
VIAL (ML) INTRAVENOUS AS NEEDED
Status: DISCONTINUED | OUTPATIENT
Start: 2021-06-15 | End: 2021-06-15 | Stop reason: SURG

## 2021-06-15 RX ORDER — FENTANYL CITRATE 50 UG/ML
50 INJECTION, SOLUTION INTRAMUSCULAR; INTRAVENOUS
Status: DISCONTINUED | OUTPATIENT
Start: 2021-06-15 | End: 2021-06-15 | Stop reason: HOSPADM

## 2021-06-15 RX ORDER — ACETAMINOPHEN 500 MG
500 TABLET ORAL ONCE
Status: COMPLETED | OUTPATIENT
Start: 2021-06-15 | End: 2021-06-15

## 2021-06-15 RX ORDER — DIPHENHYDRAMINE HCL 25 MG
25 CAPSULE ORAL
Status: DISCONTINUED | OUTPATIENT
Start: 2021-06-15 | End: 2021-06-15 | Stop reason: HOSPADM

## 2021-06-15 RX ORDER — NALOXONE HCL 0.4 MG/ML
0.2 VIAL (ML) INJECTION AS NEEDED
Status: DISCONTINUED | OUTPATIENT
Start: 2021-06-15 | End: 2021-06-15 | Stop reason: HOSPADM

## 2021-06-15 RX ORDER — ONDANSETRON 4 MG/1
4 TABLET, FILM COATED ORAL EVERY 8 HOURS PRN
Qty: 30 TABLET | Refills: 0 | Status: SHIPPED | OUTPATIENT
Start: 2021-06-15

## 2021-06-15 RX ORDER — ISOPROPYL ALCOHOL 70 ML/100ML
LIQUID TOPICAL AS NEEDED
Status: DISCONTINUED | OUTPATIENT
Start: 2021-06-15 | End: 2021-06-15 | Stop reason: HOSPADM

## 2021-06-15 RX ORDER — ACETAMINOPHEN 650 MG
TABLET, EXTENDED RELEASE ORAL AS NEEDED
Status: DISCONTINUED | OUTPATIENT
Start: 2021-06-15 | End: 2021-06-15 | Stop reason: HOSPADM

## 2021-06-15 RX ORDER — SODIUM CHLORIDE 0.9 % (FLUSH) 0.9 %
10 SYRINGE (ML) INJECTION AS NEEDED
Status: DISCONTINUED | OUTPATIENT
Start: 2021-06-15 | End: 2021-06-15 | Stop reason: HOSPADM

## 2021-06-15 RX ORDER — ONDANSETRON 2 MG/ML
4 INJECTION INTRAMUSCULAR; INTRAVENOUS ONCE AS NEEDED
Status: COMPLETED | OUTPATIENT
Start: 2021-06-15 | End: 2021-06-15

## 2021-06-15 RX ORDER — OXYCODONE AND ACETAMINOPHEN 7.5; 325 MG/1; MG/1
1 TABLET ORAL ONCE AS NEEDED
Status: DISCONTINUED | OUTPATIENT
Start: 2021-06-15 | End: 2021-06-15 | Stop reason: HOSPADM

## 2021-06-15 RX ORDER — DOCUSATE SODIUM 100 MG/1
100 CAPSULE, LIQUID FILLED ORAL 2 TIMES DAILY
Qty: 60 CAPSULE | Refills: 0 | Status: SHIPPED | OUTPATIENT
Start: 2021-06-15

## 2021-06-15 RX ORDER — IBUPROFEN 600 MG/1
600 TABLET ORAL ONCE AS NEEDED
Status: DISCONTINUED | OUTPATIENT
Start: 2021-06-15 | End: 2021-06-15 | Stop reason: HOSPADM

## 2021-06-15 RX ORDER — SODIUM CHLORIDE 0.9 % (FLUSH) 0.9 %
10 SYRINGE (ML) INJECTION EVERY 12 HOURS SCHEDULED
Status: DISCONTINUED | OUTPATIENT
Start: 2021-06-15 | End: 2021-06-15 | Stop reason: HOSPADM

## 2021-06-15 RX ORDER — FENTANYL CITRATE 50 UG/ML
INJECTION, SOLUTION INTRAMUSCULAR; INTRAVENOUS AS NEEDED
Status: DISCONTINUED | OUTPATIENT
Start: 2021-06-15 | End: 2021-06-15 | Stop reason: SURG

## 2021-06-15 RX ORDER — HYDROMORPHONE HYDROCHLORIDE 1 MG/ML
0.5 INJECTION, SOLUTION INTRAMUSCULAR; INTRAVENOUS; SUBCUTANEOUS
Status: DISCONTINUED | OUTPATIENT
Start: 2021-06-15 | End: 2021-06-15 | Stop reason: HOSPADM

## 2021-06-15 RX ORDER — MIDAZOLAM HYDROCHLORIDE 1 MG/ML
1 INJECTION INTRAMUSCULAR; INTRAVENOUS
Status: COMPLETED | OUTPATIENT
Start: 2021-06-15 | End: 2021-06-15

## 2021-06-15 RX ORDER — ONDANSETRON 2 MG/ML
INJECTION INTRAMUSCULAR; INTRAVENOUS AS NEEDED
Status: DISCONTINUED | OUTPATIENT
Start: 2021-06-15 | End: 2021-06-15 | Stop reason: SURG

## 2021-06-15 RX ORDER — DIPHENHYDRAMINE HYDROCHLORIDE 50 MG/ML
12.5 INJECTION INTRAMUSCULAR; INTRAVENOUS
Status: DISCONTINUED | OUTPATIENT
Start: 2021-06-15 | End: 2021-06-15 | Stop reason: HOSPADM

## 2021-06-15 RX ORDER — LIDOCAINE HYDROCHLORIDE 10 MG/ML
0.5 INJECTION, SOLUTION EPIDURAL; INFILTRATION; INTRACAUDAL; PERINEURAL ONCE AS NEEDED
Status: DISCONTINUED | OUTPATIENT
Start: 2021-06-15 | End: 2021-06-15 | Stop reason: HOSPADM

## 2021-06-15 RX ORDER — SCOLOPAMINE TRANSDERMAL SYSTEM 1 MG/1
1 PATCH, EXTENDED RELEASE TRANSDERMAL CONTINUOUS
Status: DISCONTINUED | OUTPATIENT
Start: 2021-06-15 | End: 2021-06-15 | Stop reason: HOSPADM

## 2021-06-15 RX ORDER — PROMETHAZINE HYDROCHLORIDE 25 MG/1
25 SUPPOSITORY RECTAL ONCE AS NEEDED
Status: DISCONTINUED | OUTPATIENT
Start: 2021-06-15 | End: 2021-06-15 | Stop reason: HOSPADM

## 2021-06-15 RX ORDER — HYDRALAZINE HYDROCHLORIDE 20 MG/ML
5 INJECTION INTRAMUSCULAR; INTRAVENOUS
Status: DISCONTINUED | OUTPATIENT
Start: 2021-06-15 | End: 2021-06-15 | Stop reason: HOSPADM

## 2021-06-15 RX ORDER — LABETALOL HYDROCHLORIDE 5 MG/ML
5 INJECTION, SOLUTION INTRAVENOUS
Status: DISCONTINUED | OUTPATIENT
Start: 2021-06-15 | End: 2021-06-15 | Stop reason: HOSPADM

## 2021-06-15 RX ORDER — CEFAZOLIN SODIUM 2 G/100ML
2 INJECTION, SOLUTION INTRAVENOUS ONCE
Status: COMPLETED | OUTPATIENT
Start: 2021-06-15 | End: 2021-06-15

## 2021-06-15 RX ORDER — OXYCODONE AND ACETAMINOPHEN 7.5; 325 MG/1; MG/1
1-2 TABLET ORAL EVERY 4 HOURS PRN
Qty: 60 TABLET | Refills: 0 | Status: SHIPPED | OUTPATIENT
Start: 2021-06-15

## 2021-06-15 RX ORDER — BUPIVACAINE HYDROCHLORIDE 5 MG/ML
INJECTION, SOLUTION EPIDURAL; INTRACAUDAL
Status: COMPLETED | OUTPATIENT
Start: 2021-06-15 | End: 2021-06-15

## 2021-06-15 RX ORDER — FLUMAZENIL 0.1 MG/ML
0.2 INJECTION INTRAVENOUS AS NEEDED
Status: DISCONTINUED | OUTPATIENT
Start: 2021-06-15 | End: 2021-06-15 | Stop reason: HOSPADM

## 2021-06-15 RX ORDER — LIDOCAINE HYDROCHLORIDE 20 MG/ML
INJECTION, SOLUTION INFILTRATION; PERINEURAL AS NEEDED
Status: DISCONTINUED | OUTPATIENT
Start: 2021-06-15 | End: 2021-06-15 | Stop reason: SURG

## 2021-06-15 RX ORDER — DEXAMETHASONE SODIUM PHOSPHATE 10 MG/ML
INJECTION INTRAMUSCULAR; INTRAVENOUS AS NEEDED
Status: DISCONTINUED | OUTPATIENT
Start: 2021-06-15 | End: 2021-06-15 | Stop reason: SURG

## 2021-06-15 RX ORDER — EPHEDRINE SULFATE 50 MG/ML
5 INJECTION, SOLUTION INTRAVENOUS ONCE AS NEEDED
Status: DISCONTINUED | OUTPATIENT
Start: 2021-06-15 | End: 2021-06-15 | Stop reason: HOSPADM

## 2021-06-15 RX ADMIN — ONDANSETRON 4 MG: 2 INJECTION INTRAMUSCULAR; INTRAVENOUS at 08:30

## 2021-06-15 RX ADMIN — BUPIVACAINE 10 ML: 13.3 INJECTION, SUSPENSION, LIPOSOMAL INFILTRATION at 06:38

## 2021-06-15 RX ADMIN — ACETAMINOPHEN 500 MG: 500 TABLET ORAL at 06:40

## 2021-06-15 RX ADMIN — BUPIVACAINE HYDROCHLORIDE 10 ML: 5 INJECTION, SOLUTION EPIDURAL; INTRACAUDAL; PERINEURAL at 06:38

## 2021-06-15 RX ADMIN — HYDROMORPHONE HYDROCHLORIDE 0.5 MG: 1 INJECTION, SOLUTION INTRAMUSCULAR; INTRAVENOUS; SUBCUTANEOUS at 08:40

## 2021-06-15 RX ADMIN — FENTANYL CITRATE 50 MCG: 50 INJECTION INTRAMUSCULAR; INTRAVENOUS at 07:22

## 2021-06-15 RX ADMIN — ONDANSETRON 4 MG: 2 INJECTION INTRAMUSCULAR; INTRAVENOUS at 08:05

## 2021-06-15 RX ADMIN — SCOPALAMINE 1 PATCH: 1 PATCH, EXTENDED RELEASE TRANSDERMAL at 06:40

## 2021-06-15 RX ADMIN — PROPOFOL 75 MCG/KG/MIN: 10 INJECTION, EMULSION INTRAVENOUS at 07:08

## 2021-06-15 RX ADMIN — MIDAZOLAM 1 MG: 1 INJECTION INTRAMUSCULAR; INTRAVENOUS at 06:29

## 2021-06-15 RX ADMIN — CEFAZOLIN SODIUM 2 G: 2 INJECTION, SOLUTION INTRAVENOUS at 07:06

## 2021-06-15 RX ADMIN — DEXAMETHASONE SODIUM PHOSPHATE 8 MG: 10 INJECTION INTRAMUSCULAR; INTRAVENOUS at 07:20

## 2021-06-15 RX ADMIN — MIDAZOLAM 1 MG: 1 INJECTION INTRAMUSCULAR; INTRAVENOUS at 06:30

## 2021-06-15 RX ADMIN — LIDOCAINE HYDROCHLORIDE 80 MG: 20 INJECTION, SOLUTION INFILTRATION; PERINEURAL at 06:59

## 2021-06-15 RX ADMIN — HYDROMORPHONE HYDROCHLORIDE 0.5 MG: 1 INJECTION, SOLUTION INTRAMUSCULAR; INTRAVENOUS; SUBCUTANEOUS at 08:30

## 2021-06-15 RX ADMIN — SODIUM CHLORIDE, POTASSIUM CHLORIDE, SODIUM LACTATE AND CALCIUM CHLORIDE 9 ML/HR: 600; 310; 30; 20 INJECTION, SOLUTION INTRAVENOUS at 06:15

## 2021-06-15 RX ADMIN — PROPOFOL 200 MG: 10 INJECTION, EMULSION INTRAVENOUS at 06:59

## 2021-06-15 NOTE — PROGRESS NOTES
In PACU complaining of pain, unable to move biceps or triceps.  Suprascapular supplemented with 5 ml bup .25%

## 2021-06-15 NOTE — ANESTHESIA PROCEDURE NOTES
Airway  Urgency: elective    Date/Time: 6/15/2021 7:00 AM  Airway not difficult    General Information and Staff    Patient location during procedure: OR  Anesthesiologist: Ronaldo Yadav MD  CRNA: Radha Varner CRNA    Indications and Patient Condition  Indications for airway management: airway protection    Preoxygenated: yes  MILS maintained throughout  Mask difficulty assessment: 1 - vent by mask    Final Airway Details  Final airway type: supraglottic airway      Successful airway: classic and LMA  Size 4    Number of attempts at approach: 1  Assessment: lips, teeth, and gum same as pre-op and atraumatic intubation

## 2021-06-15 NOTE — ANESTHESIA PREPROCEDURE EVALUATION
Anesthesia Evaluation     history of anesthetic complications: PONV  NPO Solid Status: > 8 hours             Airway   Mallampati: III  TM distance: >3 FB  Neck ROM: full  No difficulty expected  Dental - normal exam     Pulmonary - negative pulmonary ROS and normal exam   (-) not a smoker  Cardiovascular - normal exam    ECG reviewed  Rhythm: regular        Neuro/Psych  GI/Hepatic/Renal/Endo - negative ROS     Musculoskeletal (-) negative ROS    Abdominal  - normal exam   Substance History - negative use     OB/GYN          Other                      Anesthesia Plan    ASA 2     general with block   (  D/W R&B of GA including but not limited to: heart, lung, liver, kidney, neurologic problems, positioning injuries, dental damage, corneal abrasion and TMJ.  .Risks of peripheral nerve block were discussed with patient including but not limited to: inadequate block, bleeding, infection, persistent numbness or weakness, nerve damage, painful dysasthesia and need to protect limb while numb.    Severe PONV- consider TIVA or subhypnotic propofol)  intravenous induction     Anesthetic plan, all risks, benefits, and alternatives have been provided, discussed and informed consent has been obtained with: patient.

## 2021-06-15 NOTE — OP NOTE
Orthopaedic Operative Note    Facility: [facility]    Patient: Sarika Nugent    Medical Record Number: [id number]    YOB: 1970    Dictating Surgeon: Eusebio Pa M.D.*    Primary Care Physician: Provider, No Known    Date of Operation: 6/15/2021    Pre-Operative Diagnosis:  Right glenoid labral tear, subacromial impingement syndrome and symptomatic acromioclavicular arthritis    Post-Operative Diagnosis:  Right glenoid labral tear, partial-thickness rotator cuff tear, subacromial impingement syndrome and symptomatic acromioclavicular arthritis    Procedure Performed:    1.  Right shoulder arthroscopic biceps tenodesis    2.  Arthroscopic rotator cuff debridement  3.  Arthroscopic labral debridement  4.  Subacromial decompression and extensive bursectomy  5.  Arthroscopic distal clavicle excision    Surgeon: Eusebio Pa MD     Assistant: Peggy Sorenson whose assistance was critical for help with patient positioning, suctioning and irrigation, retraction, manipulation of the extremity for insertion of the implants, wound closure and application of the bandages.  Her assistance was critical to the success of this case.     Anesthesia: Regional followed by general    Complications: None.     Estimated Blood Loss: Less than 50 mL.     Implants: Arthrex 7 mm biceps tenodesis swivel lock anchor for arthroscopic biceps tenodesis    Specimens: * No orders in the log *    Brief Operative Indication: Ms. Nugent had a long history of right shoulder pain which had been nonresponsive to prolonged conservative treatment.  We had a thorough discussion regarding the risks, benefits and alternatives to surgical intervention.  I explained that surgical risks include infection, hematoma, anchor related complications including failure of fixation, loosening, cutout of the anchors, chondrolysis, rotator cuff re-tear necessitating revision surgery, persistent pain and/or loss of motion,  iatrogenic nerve and/or blood vessel injury resulting in permanent weakness, numbness or dysfunction, RSD, DVT, PE, positioning related neuropraxia, and anesthesia related complications resulting in death.  We further discussed the possible need to address the biceps with a possible tenotomy versus tenodesis.  We discussed the fact that if the biceps demonstrates significant pathology in the groove that I would plan to perform a tenotomy which could result in lebron deformity or persistent pain, weakness or cramping.  We also discussed possible risks with a tenodesis as well including screw related complications including cutout, chondrolysis, failure of fixation with re-tear of the biceps, fracture and possible iatrogenic nerve injury.  The patient voiced understanding of the risks, benefits, and alternative forms of treatment that were discussed and consented to proceed.    Description of the procedure in detail:  The patient and operative site were identified in the preoperative holding area and the surgical site was marked.  Adequate regional anesthesia of the right upper extremity was administered.  Following this, the patient was taken to the operating room and placed in the supine position.  Adequate general anesthesia was then administered and the patient was repositioned on the operating table.  All bony prominences were carefully padded and protected while the patient was positioned in the lateral decubitus position.  The arm was prepped and draped in the standard, sterile fashion.  I cleaned the extremity with an alcohol solution.  A Hibiclens scrub was performed and then the extremity was prepped with 2 ChloraPrep preps.  I allowed those to dry for 3 minutes before the draping procedure was carried out and the arm placed into 10 pounds of lateral traction.  A timeout was taken and preoperative antibiotics administered prior to surgical incision.    I began the procedure by fashioning a standard posterior  portal.  The scope was inserted into the joint and directed anteriorly to the rotator interval where a standard anterior portal was then established using needle localization technique.  A 7 mm cannula was inserted into the joint and then a diagnostic arthroscopy performed.    She did have a partial-thickness tear of the anterior supraspinatus.  I measured the depth of the tearing with a probe. The depth of the tearing measured approximately 3-4 millimeters and it did not appear that a repair was indicated based upon the depth of the tearing.  This limited partial-thickness tearing was debrided with a shaver.  The remainder of her rotator cuff was completely intact.  Images before and after the debridement were taken and saved.  Her articular cartilage was well preserved.  No significant chondral pathology was noted.      The biceps anchor was detached from the supra glenoid tubercle and there was degenerative tearing of the superior labrum.  I used a probe to examine the intertubercular portion of the biceps and this was unstable.  I determined that a biceps tenodesis was warranted.  The long head of the biceps was released from the supraglenoid tubercle in anticipation of a tenodesis of that structure.  The biceps stump and degenerative superior labral tear were then debrided with a shaver.  The remainder of the labrum was probed and confirmed to be intact and stable.    I directed my attention to the subacromial space.  A standard lateral portal was established and the scope was inserted into the subacromial space.  There was extensive subacromial/subdeltoid bursitis which was debrided with a shaver.  There was fraying of the coracoacromial ligament and a modest subacromial spur consistent with impingement.  I determined that a subacromial decompression was indicated.  Using a 90 degree Arthrex Apollo device, the coracoacromial ligament was released.  A barrel tipped bur was then used to level the undersurface of  the acromion back to the level of the scapular spine.  The rotator cuff was examined at this point.  This was confirmed to be completely intact from the bursal side.  Again, no repair was indicated.    Next, I directed my attention to the distal clavicle excision.  The acromioclavicular joint was exposed.  There was extensive arthropathy and spurring off of the inferior portion of the clavicle.  The Arthrex Apollo device was inserted through the anterior portal and used to release the inferior capsule.  The distal clavicle was exposed.  I removed approximately 8-10 mm of bone from the distal clavicle under direct visualization.  Once I had completed the distal clavicle excision, I confirmed that there were no amputated fragments or remaining areas that warranted debridement by inserting the scope in both the lateral and anterior portals to visualize directly into the acromioclavicular joint.  I confirmed that this space was completely decompressed.  Final images were taken and saved.    Finally, I directed my attention to the tenodesis.  A combination of a shaver and Arthrex Walsh device were used to remove the bursal tissue extending down along the lateral edge of the proximal humerus.  I very carefully dissected out the biceps in the groove at the level of the falciform ligament.  The long head of the biceps was carefully delivered into the subacromial space.  An accessory portal was established over the supra pectoral region, just above the falciform ligament.  The long head of the biceps was delivered out of this accessory portal.  I measured the tendon diameter.  The tendon was then whip stitched with a #2 FiberWire.  The diseased portion of the biceps was debrided and removed.  An appropriate diameter bullet tip reamer was used to ream a  hole in the supra pectoral region within the groove.  This was carried down to a depth of 20 mm.  The aperture of the hole was carefully debrided of bony and soft  tissue debris.  The long head of the biceps was then delivered into this tunnel and secured with the Arthrex biceps tenodesis swivel lock.  The screw got excellent fixation in the bone and seemed to secure the tendon very well.  The sutures from the swivel lock were then tied to those from the whipstitch of the tendon to further supplement the fixation.  The tendon seemed to be well fixed and secure.  Final images were taken.    The instruments were withdrawn.  The wounds were copiously irrigated out with sterile saline.  The wounds were closed in a layered fashion.  Sterile dressings were applied.  The drapes were withdrawn.  The arm was placed in an immobilizer.  The patient was awakened and taken to the recovery room in good condition.    Eusebio Pa MD  06/15/21

## 2021-06-15 NOTE — ANESTHESIA POSTPROCEDURE EVALUATION
Patient: Sarika Nugent    Procedure Summary     Date: 06/15/21 Room / Location:  [facility]  [facility]    Anesthesia Start: 0651 Anesthesia Stop: 0825    Procedure: SHOULDER ARTHROSCOPY, labral   debridement,  biceps  tenodesis.  Subacromial decompression and distal clavicle excision., rotaator cuff debridement (Right Shoulder) Diagnosis:       Chronic right shoulder pain      (Chronic right shoulder pain [M25.511, G89.29])    Surgeons: Eusebio Pa MD Provider: Ronaldo Yadav MD    Anesthesia Type: general with block ASA Status: 2          Anesthesia Type: general with block    Vitals  Vitals Value Taken Time   /95 06/15/21 0900   Temp 36.4 °C (97.6 °F) 06/15/21 0900   Pulse 93 06/15/21 0904   Resp 16 06/15/21 0900   SpO2 94 % 06/15/21 0904   Vitals shown include unvalidated device data.        Post Anesthesia Care and Evaluation    Patient location during evaluation: bedside  Patient participation: complete - patient participated  Level of consciousness: awake  Pain management: adequate  Airway patency: patent  Anesthetic complications: No anesthetic complications  PONV Status: controlled  Cardiovascular status: acceptable  Respiratory status: acceptable  Hydration status: acceptable    Comments: --------------------            06/15/21               0909     --------------------   BP:       131/91     Pulse:      73       Resp:       16       Temp:                SpO2:      97%      --------------------

## 2021-06-15 NOTE — BRIEF OP NOTE
SHOULDER ARTHROSCOPY SUPERIOR LABRAL ANTERIOR POSTERIOR TEAR REPAIR  Progress Note    Sarika SCHRADER Raffi  6/15/2021    Pre-op Diagnosis:   Chronic right shoulder pain [M25.511, G89.29]       Post-Op Diagnosis Codes:     * Chronic right shoulder pain [M25.511, G89.29]    Procedure/CPT® Codes:        Procedure(s):  SHOULDER ARTHROSCOPY, labral   debridement,  biceps  tenodesis.  Subacromial decompression and distal clavicle excision., rotaator cuff debridement    Surgeon(s):  Eusebio Pa MD    Anesthesia: General with Block    Staff:   Circulator: Mehnaz Antonio RN  Scrub Person: Carissa Leal  Vendor Representative: Tam Wakefield  Assistant: Peggy Sorenson  Assistant: Peggy Sorenson      Estimated Blood Loss: minimal    Urine Voided: * No values recorded between 6/15/2021  6:52 AM and 6/15/2021  8:22 AM *    Specimens:                None          Drains: * No LDAs found *    Findings: See dictation    Complications: None    Assistant: Peggy Sorenson  was responsible for performing the following activities: Retraction and their skilled assistance was necessary for the success of this case.    Eusebio Pa MD     Date: 6/15/2021  Time: 08:33 EDT

## 2021-06-16 ENCOUNTER — TELEPHONE (OUTPATIENT)
Dept: ORTHOPEDIC SURGERY | Facility: CLINIC | Age: 51
End: 2021-06-16

## 2021-06-16 NOTE — TELEPHONE ENCOUNTER
Postop follow-up call.  Left message for patient to call with any questions or concerns prior to her follow-up appointment next week.

## 2021-06-17 NOTE — TELEPHONE ENCOUNTER
Caller: WILLY MANSFIELD    Relationship: SELF    Best call back number: [phone]    What additional details did the patient provide when requesting the medication: PATIENT WAS PRESCRIBED OXYCODONE (PERCOCET) 7.5-325, FROM 6-15-21 SURGERY. PATIENT STATED SHE IS GETTING HEADACHES FROM MEDICATION AND WOULD LIKE TO GET A DIFFERENT PRESCRIPTION FOR PAIN. PATIENT WOULD LIKE A CALL BACK TO DISCUSS OPTIONS.      What is the patient's preferred pharmacy:    [facility] [address] [place] [id number] [facility] [phone] [facility] [phone]

## 2021-06-18 RX ORDER — HYDROCODONE BITARTRATE AND ACETAMINOPHEN 7.5; 325 MG/1; MG/1
1 TABLET ORAL EVERY 6 HOURS PRN
Qty: 50 TABLET | Refills: 0 | Status: SHIPPED | OUTPATIENT
Start: 2021-06-18 | End: 2021-06-30

## 2021-06-18 NOTE — TELEPHONE ENCOUNTER
Please give her a call and see if she can take hydrocodone.  If so, I will call that in for her.  Thanks.

## 2021-06-30 ENCOUNTER — OFFICE VISIT (OUTPATIENT)
Dept: ORTHOPEDIC SURGERY | Facility: CLINIC | Age: 51
End: 2021-06-30

## 2021-06-30 VITALS — WEIGHT: 173 LBS | HEIGHT: 63 IN | TEMPERATURE: 96.4 F | BODY MASS INDEX: 30.65 KG/M2

## 2021-06-30 DIAGNOSIS — Z09 SURGERY FOLLOW-UP: Primary | ICD-10-CM

## 2021-06-30 PROCEDURE — 99024 POSTOP FOLLOW-UP VISIT: CPT | Performed by: ORTHOPAEDIC SURGERY

## 2021-06-30 RX ORDER — METRONIDAZOLE 250 MG/1
250 TABLET ORAL 4 TIMES DAILY
COMMUNITY
Start: 2021-06-18

## 2021-06-30 NOTE — PROGRESS NOTES
Sarika Nugent : 1970 MRN: [id number] DATE: 2021    CC: 2 weeks s/p right shoulder arthroscopy    HPI: Patient returns to clinic today for follow up.  She is 2 weeks out from surgery.  She got the dates wrong and missed her 1 week postop visit.  She had her daughter take the sutures out.  She quit wearing her brace.  She tells me she did not feel like she needed it and it was hampering her motion.  She tells me she has tried to resume normal activities.  She has already been swimming in a pool.    Vitals:    21 1128   Temp: 96.4 °F (35.8 °C)       Exam:  Wounds appear well-approximated.  Arm and forearm soft.  Shoulder motion is better than expected at this stage.  Good motor and sensory function in the hand and wrist.  Palpable radial pulse with brisk capillary refill     Impression:  1 week s/p right shoulder biceps tenodesis, rotator cuff debridement, decompression, distal clavicle excision    Plan:    She has been totally noncompliant with all of my postop recommendations.  Despite this, I see no evidence for disruption of the tenodesis and she seems to be far ahead of schedule.  I counseled her about the importance of protecting the repair.  Appropriate activity modifications and restrictions were carefully discussed.  She is far ahead of schedule and I do not consider that she needs therapy at this time.  I am going to see her back in a month.    Eusebio Pa MD

## 2021-07-26 ENCOUNTER — OFFICE VISIT (OUTPATIENT)
Dept: ORTHOPEDIC SURGERY | Facility: CLINIC | Age: 51
End: 2021-07-26

## 2021-07-26 VITALS — TEMPERATURE: 96.6 F | BODY MASS INDEX: 30.65 KG/M2 | HEIGHT: 63 IN | WEIGHT: 173 LBS

## 2021-07-26 DIAGNOSIS — Z09 SURGERY FOLLOW-UP: Primary | ICD-10-CM

## 2021-07-26 PROCEDURE — 99024 POSTOP FOLLOW-UP VISIT: CPT | Performed by: NURSE PRACTITIONER

## 2021-07-26 NOTE — PROGRESS NOTES
Sarika SCHRADER Raffi : 1970 MRN: [id number] DATE: 2021    CC: 6 weeks s/p right shoulder arthroscopy    HPI: Pt. returns to clinic today for follow up.  Reports pain is well controlled.  Reports motion is progressing.  Denies any new issues or problems.    Vitals:    21 1054   Temp: 96.6 °F (35.9 °C)     Exam:  Wounds appear well-healed.  Arm and forearm soft.  Shoulder motion is full with forward elevation, 55° external rotation, internal rotation to L5.  Good strength with resisted forward elevation in the scapular plane. Good motor and sensory function distally.  Palpable radial pulse with good cap refill.      Impression:  6 weeks s/p right shoulder scopic biceps tenodesis, rotator cuff debridement, decompression, distal clavicle excision    Plan:    1.  I have given her a referral to physical therapy.  I demonstrated some exercises she may try at home as well.  2.  Counseled the patient about appropriate activity modifications and restrictions.  3.  Follow up in 6 weeks with Dr. Pa.     Maame Hooper, FAB     2021    Answers for HPI/ROS submitted by the patient on 2021  Please describe your symptoms.: Follow up  Have you had these symptoms before?: No  How long have you been having these symptoms?: Greater than 2 weeks  What is the primary reason for your visit?: Other

## 2021-08-23 ENCOUNTER — HOSPITAL ENCOUNTER (OUTPATIENT)
Dept: PHYSICAL THERAPY | Facility: HOSPITAL | Age: 51
Setting detail: THERAPIES SERIES
Discharge: HOME | End: 2021-08-23

## 2021-08-23 DIAGNOSIS — Z98.890 HISTORY OF ARTHROSCOPY OF RIGHT SHOULDER: Primary | ICD-10-CM

## 2021-08-23 DIAGNOSIS — M25.511 RIGHT SHOULDER PAIN, UNSPECIFIED CHRONICITY: ICD-10-CM

## 2021-08-23 DIAGNOSIS — Z74.09 IMPAIRED MOBILITY: ICD-10-CM

## 2021-08-23 DIAGNOSIS — Z47.89 ORTHOPEDIC AFTERCARE: ICD-10-CM

## 2021-08-23 PROCEDURE — 97161 PT EVAL LOW COMPLEX 20 MIN: CPT | Performed by: PHYSICAL THERAPIST

## 2021-08-23 PROCEDURE — 97110 THERAPEUTIC EXERCISES: CPT | Performed by: PHYSICAL THERAPIST

## 2021-08-23 NOTE — THERAPY EVALUATION
Outpatient Physical Therapy Ortho Initial Evaluation  [facility]     Patient Name: Sarika Nugent  : 1970  MRN: [id number]  Today's Date: 2021      Visit Date: 2021    Patient Active Problem List   Diagnosis   • Peroneal tendon tear, left, subsequent encounter   • Numbness in left leg   • Tear of medial meniscus of left knee, current   • Tear of medial meniscus of right knee, current   • Chondromalacia of right knee   • Tear of medial meniscus of right knee   • Meniscal cyst, left   • Fibroadenoma of left breast in female   • Chronic right shoulder pain        Past Medical History:   Diagnosis Date   • Anxiety and depression    • Constipation    • IBS (irritable bowel syndrome)    • Lupus (CMS/HCC)    • Migraine    • PONV (postoperative nausea and vomiting)     SEVERE W/ MIGRAINES FOR DAYS. SCOPE PATCH HAS WORKED IN THE PAST   • Right shoulder injury         Past Surgical History:   Procedure Laterality Date   • KNEE ARTHROSCOPY Bilateral    • KNEE ARTHROSCOPY Right 2020    Procedure: right KNEE ARTHROSCOPY with debridement as needed, partial medial menisectomy, abrasion chondroplasty;  Surgeon: Tam Stewart MD;  Location: [facility];  Service: Orthopedics;  Laterality: Right;   • KNEE ARTHROSCOPY Left 2020    Procedure: left KNEE ARTHROSCOPY, PARTIAL MEDIAL MENISECTOMY, PLICA EXCISION, ABRASION CHONDROPLASTY;  Surgeon: Tam Stewart MD;  Location: [facility];  Service: Orthopedics;  Laterality: Left;   • KNEE SURGERY     • RHINOPLASTY     • SHOULDER ARTHROSCOPY W/ SUPERIOR LABRAL ANTERIOR POSTERIOR REPAIR Right 6/15/2021    Procedure: SHOULDER ARTHROSCOPY, labral   debridement,  biceps  tenodesis.  Subacromial decompression and distal clavicle excision., rotaator cuff debridement;  Surgeon: Eusebio Pa MD;  Location: [facility];  Service: Orthopedics;  Laterality: Right;   • TUBAL ABDOMINAL LIGATION     • WRIST FRACTURE SURGERY Left   "      Visit Dx:     ICD-10-CM ICD-9-CM   1. History of arthroscopy of right shoulder  Z98.890 V45.89   2. Right shoulder pain, unspecified chronicity  M25.511 719.41   3. Impaired mobility  Z74.09 799.89   4. Orthopedic aftercare  Z47.89 V54.9         Patient History     Row Name 08/23/21 0800 08/22/21 1904          History    Chief Complaint  Joint stiffness;Joint swelling;Muscle tenderness;Muscle weakness;Pain;Tightness  -  Joint stiffness;Joint swelling;Muscle tenderness;Muscle weakness;Pain;Tightness  (Pended)   (Patient-Rptd)   -patient     Type of Pain  Shoulder pain  -GJ  Shoulder pain  (Pended)   (Patient-Rptd)   -patient     Date Current Problem(s) Began  06/15/21  -GJ  06/15/21  (Pended)   (Patient-Rptd)   -patient     Brief Description of Current Complaint  Ms. Nugent is a 49 y/o R handed female. She is 10 weeks s/p R shoulder biceps tenodesis, rotator cuff debridement, decompression, distal clavicle excision on 6/15/2021. She reports R shoulder pain since 12/2020.  She reports \"not being a fan of physical therapy.\"  She denies N/T of RUE. She reports AM is worse then PM,, has been out of her sling since June.  Hard to get behind back, Been doing biceps curls with 5#'s and shoulder presses with 5# while on TM.    -GJ  Post-Op  (Pended)   (Patient-Rptd)   -patient     Previous treatment for THIS PROBLEM  Surgery  -GJ  --     Surgery Date:  06/15/21  -GJ  --     Patient/Caregiver Goals  Relieve pain;Return to prior level of function;Improve mobility;Improve strength  -GJ  Relieve pain;Return to prior level of function;Improve mobility;Improve strength  (Pended)   (Patient-Rptd)   -patient     Hand Dominance  right-handed  -GJ  right-handed  (Pended)   (Patient-Rptd)   -patient     Occupation/sports/leisure activities  no working, Home workout  -GJ  Home workout  (Pended)   (Patient-Rptd)   -patient     Patient seeing anyone else for problem(s)?  --  No  (Pended)   (Patient-Rptd)   -patient     What " clinical tests have you had for this problem?  --  Other 2 (comment)  (Pended)   (Patient-Rptd)   -patient     Other Clinical Tests  --  Surgery on right shoulder  (Pended)   (Patient-Rptd)   -patient     Are you or can you be pregnant  No  -GJ  No  (Pended)   (Patient-Rptd)   -patient        Pain     Pain Location  Shoulder  -GJ  --        Fall Risk Assessment    Any falls in the past year:  No  -GJ  No  (Pended)   (Patient-Rptd)   -patient        Services    Prior Rehab/Home Health Experiences  --  No  (Pended)   (Patient-Rptd)   -patient     Are you currently receiving Home Health services  --  No  (Pended)   (Patient-Rptd)   -patient     Do you plan to receive Home Health services in the near future  --  No  (Pended)   (Patient-Rptd)   -patient        Daily Activities    Primary Language  English  -GJ  English  (Pended)   (Patient-Rptd)   -patient     Are you able to read  Yes  -GJ  Yes  (Pended)   (Patient-Rptd)   -patient     Are you able to write  Yes  -GJ  Yes  (Pended)   (Patient-Rptd)   -patient     How does patient learn best?  Reading;Other (comment)  -GJ  Reading;Other (comment)  (Pended)   (Patient-Rptd)   -patient     Additional ways patient learns?  -- hands on  -GJ  Hands on  (Pended)   (Patient-Rptd)   -patient     Teaching needs identified  Home Exercise Program;Management of Condition  -GJ  --     Patient is concerned about/has problems with  Difficulty with self care (i.e. bathing, dressing, toileting:;Flexibility;Grasping objects lifting;Performing home management (household chores, shopping, care of dependents);Performing job responsibilities/community activities (work, school,;Performing sports, recreation, and play activities;Reaching over head;Repetitive movements of the hand, arm, shoulder  -GJ  --     Barriers to learning  None  -GJ  --     Pt Participated in POC and Goals  Yes  -GJ  --        Safety    Are you being hurt, hit, or frightened by anyone at home or in your life?  No  -GJ  No   (Pended)   (Patient-Rptd)   -patient     Are you being neglected by a caregiver  No  -GJ  No  (Pended)   (Patient-Rptd)   -patient     Have you had any of the following issues with  --  Depression;Anxiety  (Pended)   (Patient-Rptd)   -patient       User Key  (r) = Recorded By, (t) = Taken By, (c) = Cosigned By    Initials Name Provider Type    GJ José Manuel Piña, PT Physical Therapist    patient Sarika Nugent --          PT Ortho     Row Name 08/23/21 0800       Posture/Observations    Alignment Options  Forward head;Rounded shoulders  -GJ    Forward Head  Mild  -GJ    Rounded Shoulders  Right:;Mild;Moderate  -GJ    Observations  Incision healing  -GJ       Special Tests/Palpation    Special Tests/Palpation  Shoulder  -GJ       Shoulder Girdle Palpation    Shoulder Girdle Palpation?  Yes  -GJ    Upper Trap  Right:  -GJ    Levator Scapula  Right:;Tender;Guarded/taut  -GJ       General ROM    RT Upper Ext  Rt Shoulder ABduction;Rt Shoulder Flexion;Rt Shoulder External Rotation;Rt Shoulder Internal Rotation  -GJ    LT Upper Ext  Lt Shoulder ABduction;Lt Shoulder Flexion;Lt Shoulder External Rotation;Lt Shoulder Internal Rotation  -GJ    GENERAL ROM COMMENTS  grossly noted full bilateral elbow flexion/ext, supination/pronation, wrist flex/ext   -GJ       Right Upper Ext    Rt Shoulder Abduction AROM  130 seated, superior migration humeral head, pain  -GJ    Rt Shoulder Abduction PROM  160 supine  -GJ    Rt Shoulder Flexion AROM  140 seated, superior migration humeral head  -GJ    Rt Shoulder Flexion PROM  145 supine, painful  -GJ    Rt Shoulder External Rotation AROM  LI midline T 1, c spine flexion  -GJ    Rt Shoulder External Rotation PROM  40 supine pos 45 abd  -GJ    Rt Shoulder Internal Rotation AROM  FIR midline belt line with effort/pain  -GJ    Rt Shoulder Internal Rotation PROM  40 supine pos 45 abd  -GJ       Left Upper Ext    Lt Shoulder Abduction AROM  175 dated  -GJ    Lt Shoulder Abduction PROM  175  supine  -GJ    Lt Shoulder Flexion AROM  170 seated  -GJ    Lt Shoulder Flexion PROM  175 supine  -GJ    Lt Shoulder External Rotation AROM  LI midline T 3  -GJ    Lt Shoulder External Rotation PROM  100 supine pos 45 abd  -GJ    Lt Shoulder Internal Rotation AROM  FIR midline T 5,   -GJ    Lt Shoulder Internal Rotation PROM  90 supine pos 45 abd  -GJ       MMT (Manual Muscle Testing)    Rt Upper Ext  Rt Shoulder Flexion;Rt Shoulder ABduction;Rt Shoulder Internal Rotation;Rt Shoulder External Rotation;Rt Elbow Flexion;Rt Elbow Extension;Rt Wrist Flexion;Rt Wrist Extension  -GJ    Lt Upper Ext  Lt Shoulder Flexion;Lt Shoulder ABduction;Lt Shoulder Internal Rotation;Lt Shoulder External Rotation;Lt Elbow Extension;Lt Elbow Flexion;Lt Wrist Flexion;Lt Wrist Extension  -GJ    General MMT Comments  --  -GJ       MMT Right Upper Ext    Rt Shoulder Flexion MMT, Gross Movement  (4-/5) good minus in availabl range, to isometric testing   -GJ    Rt Shoulder ABduction MMT, Gross Movement  (4-/5) good minus in availabl range, to isometric testing  -GJ    Rt Shoulder Internal Rotation MMT, Gross Movement  (4/5) good in availabl range, to isometric testing  -GJ    Rt Shoulder External Rotation MMT, Gross Movement  (4/5) good in availabl range, to isometric testing  -GJ    Rt Elbow Flexion MMT, Gross Movement:  (4-/5) good minus in availabl range, to isometric testing  -GJ    Rt Elbow Extension MMT, Gross Movement:  (4+/5) good plus  -GJ    Rt Wrist Flexion MMT, Gross Movement  (5/5) normal  -GJ    Rt Wrist Extension MMT, Gross Movement  (5/5) normal  -GJ       MMT Left Upper Ext    Lt Shoulder Flexion MMT, Gross Movement  (4+/5) good plus  -GJ    Lt Shoulder ABduction MMT, Gross Movement  (4+/5) good plus  -GJ    Lt Shoulder Internal Rotation MMT, Gross Movement  (5/5) normal  -GJ    Lt Shoulder External Rotation MMT, Gross Movement  (5/5) normal  -GJ    Lt Elbow Flexion MMT, Gross Movement  (5/5) normal  -GJ    Lt Elbow  Extension MMT, Gross Movement  (5/5) normal  -GJ    Lt Wrist Flexion MMT, Gross Movement  (5/5) normal  -GJ    Lt Wrist Extension MMT, Gross Movement  (5/5) normal  -GJ       Flexibility    Flexibility Tested?  Upper Extremity  -GJ       Upper Extremity Flexibility    Upper Trapezius  Right:;Mildly limited;Moderately limited  -GJ    Levator Scapula  Right:;Mildly limited;Moderately limited  -GJ    Pect Minor  Right:;Mildly limited;Moderately limited  -GJ    Pect Major  Right:;Mildly limited;Moderately limited  -GJ    Latissimus Dorsi  Right:;Mildly limited;Moderately limited  -GJ      User Key  (r) = Recorded By, (t) = Taken By, (c) = Cosigned By    Initials Name Provider Type    GJ José Manuel Piña, PT Physical Therapist                      Therapy Education  Education Details: discussed dx, px, poc, discussed anatomy of the shoulder and physiology of healing, discussed realistic expectations and time frames for therapy, issued protocol,reviewed precautions, tissue healing times etc  Given: HEP, Symptoms/condition management, Pain management, Posture/body mechanics, Mobility training, Edema management  Program: New  How Provided: Verbal, Demonstration, Written  Provided to: Patient  Level of Understanding: Teach back education performed, Verbalized, Demonstrated     PT OP Goals     Row Name 08/23/21 0800          PT Short Term Goals    STG Date to Achieve  09/23/21  -GJ     STG 1  pt. to be I with initial HEP to facilitate self management of their condition  -GJ     STG 1 Progress  New  -GJ     STG 2  pt. to be educated in/verbalize understanding of the importance of posture/ergonomics in association with their condition to facilitate self management of their condition  -GJ     STG 2 Progress  New  -GJ     STG 3  pt. to demonstrate R shoulder flexion PROM >/= 0-165 to facilitate ease of performing self care activities  -GJ     STG 3 Progress  New  -GJ     STG 4  pt. to demonstrate R shoulder PROM ER >/= 0-65 to  facilitate ease of performing self care activities  -     STG 4 Progress  New  -        Long Term Goals    LTG Date to Achieve  11/26/21  -GJ     LTG 1  pt. to be I with advanced HEP to facilitate self management of their condition  -GJ     LTG 1 Progress  New  -GJ     LTG 2  pt. to demonstrate R shoulder AROM FIR >/= mid line T 10 to facilitate ease of performing self care/dressing activities  -GJ     LTG 2 Progress  New  -GJ     LTG 3  pt. to demonstrate R shoulder flexion AROM >/= 0-160 to facilitate ease of performing functional/household activities  -GJ     LTG 3 Progress  New  -GJ     LTG 4  pt. to demonstrate placing/retrieving small object from an above the shoulder level shelf with her RUE to facilitate ease of performing household/work related activities  -     LTG 4 Progress  New  -        Time Calculation    PT Goal Re-Cert Due Date  11/26/21  -       User Key  (r) = Recorded By, (t) = Taken By, (c) = Cosigned By    Initials Name Provider Type     José Manuel Piña, PT Physical Therapist          PT Assessment/Plan     Row Name 08/23/21 0922          PT Assessment    Functional Limitations  Limitation in home management;Limitations in community activities;Limitations in functional capacity and performance;Performance in leisure activities;Performance in self-care ADL;Performance in sport activities  -     Impairments  Impaired flexibility;Impaired muscle endurance;Impaired muscle length;Impaired muscle power;Impaired postural alignment;Joint mobility;Muscle strength;Pain;Poor body mechanics;Posture;Range of motion  -     Assessment Comments  Ms. Nugent is a 49 y/o R handed female. She is 10 weeks s/p R shoulder biceps tenodesis, rotator cuff debridement, decompression, distal clavicle excision on 6/15/2021. She reports R shoulder pain since 12/2020.  She reports “not being a fan of physical therapy.”  She denies N/T of RUE. She reports AM is worse then PM,, has been out of her sling  since June.  Hard to get behind back, Been doing biceps curls with 5#’s and shoulder presses with 5# while on TM.  Ms. Nugent presents without sling. She demonstrates mild FHP/mile rounded shoulder posture. She demonstrates limited R shoulder A/PROM in all planes.  She demonstrates full R elbow AROM. She demonstrates weakness with shoulder flexion/abd, R elbow flexion.  Incisions well healed. She reports a quick DASH score of 9, scored 0-100, 0 represents no perceived disability. Ms. Nugent demonstrates stable s/s consistent following above procedure which limits her participation in household, community, leisure, fitness activities.    Aggravating/Personal factors affecting recovery include,  but are not limited to, adherence issues to post operative protocol (protocol was issued to pt today).  Ms. Nugent may benefit from skilled physical therapy to address the above impairments  -GJ     Please refer to paper survey for additional self-reported information  Yes  -GJ     Rehab Potential  Good  -GJ     Patient would benefit from skilled therapy intervention  Yes  -GJ        PT Plan    PT Frequency  1x/week;2x/week  -GJ     Predicted Duration of Therapy Intervention (PT)  10 visits  -GJ     Planned CPT's?  PT EVAL LOW COMPLEXITY: 95636;PT RE-EVAL: 65630;PT THER PROC EA 15 MIN: 65693;PT THER ACT EA 15 MIN: 80161;PT MANUAL THERAPY EA 15 MIN: 49911;PT NEUROMUSC RE-EDUCATION EA 15 MIN: 75204;PT SELF CARE/HOME MGMT/TRAIN EA 15: 09670;PT HOT OR COLD PACK TREAT MCARE;PT ELECTRICAL STIM UNATTEND: ;PT ULTRASOUND EA 15 MIN: 16577  -GJ     PT Plan Comments  warm up on pulleys, shoulder ext/scap retraction with Tband, AAROM flexion on wall, gentle biceps curls, prone shoulder ext, prone rows, joint mobs/PROM/RS at 110 flexion, and with Er/IR  -GJ       User Key  (r) = Recorded By, (t) = Taken By, (c) = Cosigned By    Initials Name Provider Type    José Manuel Abarca, PT Physical Therapist            OP Exercises      Row Name 08/23/21 0900 08/23/21 0841          Total Minutes    30173 - PT Therapeutic Exercise Minutes  --  10  -GJ        Exercise 1    Exercise Name 1  supine AAROM flexion, hands clapsed  -GJ  --     Cueing 1  Verbal;Demo  -GJ  --     Reps 1  15  -GJ  --     Time 1  5s  -GJ  --        Exercise 2    Exercise Name 2  supine ER AAROM with cane  -GJ  --     Cueing 2  Verbal;Tactile;Demo  -GJ  --     Reps 2  15  -GJ  --     Time 2  5s   -GJ  --     Additional Comments  towel under humerus  -GJ  --        Exercise 3    Exercise Name 3  chest press  -GJ  --     Cueing 3  Verbal;Demo  -GJ  --     Reps 3  15  -GJ  --     Additional Comments  cane  -GJ  --        Exercise 4    Exercise Name 4  reverses shoulder rolls  -GJ  --     Cueing 4  Verbal;Demo  -GJ  --     Reps 4  15  -GJ  --        Exercise 5    Exercise Name 5  scap retraction  -GJ  --     Cueing 5  Verbal;Demo  -GJ  --     Reps 5  15  -GJ  --     Time 5  5s  -GJ  --        Exercise 6    Exercise Name 6  FIR with towel  -GJ  --     Cueing 6  Verbal;Demo  -GJ  --     Reps 6  5  -GJ  --     Time 6  10s  -GJ  --       User Key  (r) = Recorded By, (t) = Taken By, (c) = Cosigned By    Initials Name Provider Type    José Manuel Abarca, PT Physical Therapist                        Outcome Measure Options: Quick DASH  Quick DASH  Open a tight or new jar.: No Difficulty  Do heavy household chores (e.g., wash walls, wash floors): No Difficulty  Carry a shopping bag or briefcase: No Difficulty  Wash your back: No Difficulty  Use a knife to cut food: No Difficulty  Recreational activities in which you take some force or impact through your arm, should or hand (e.g. golf, hammering, tennis, etc.): No Difficulty  During the past week, to what extent has your arm, shoulder, or hand problem interfered with your normal social activites with family, friends, neighbors or groups?: Not at all  During the past week, were you limited in your work or other regular daily activities as a  result of your arm, shoulder or hand problem?: Not limited at all  Arm, Shoulder, or hand pain: Mild  Tingling (pins and needles) in your arm, shoulder, or hand: Mild  During the past week, how much difficulty have you had sleeping because of the pain in your arm, shoulder or hand?: Moderate Difficiculty  Number of Questions Answered: 11  Quick DASH Score: 9.09         Time Calculation:     Start Time: 0841 (appt time 0830)  Stop Time: 0915  Time Calculation (min): 34 min  Timed Charges  71630 - PT Therapeutic Exercise Minutes: 10  Total Minutes  Timed Charges Total Minutes: 10   Total Minutes: 10     Therapy Charges for Today     Code Description Service Date Service Provider Modifiers Qty    70216782348 HC PT THER PROC EA 15 MIN 8/23/2021 José Manuel Piña, PT GP 1    41450049295 HC PT EVAL LOW COMPLEXITY 2 8/23/2021 José Manuel Piña, PT GP 1          PT G-Codes  Outcome Measure Options: Quick DASH  Quick DASH Score: 9.09         José Manuel Piña, PT  8/23/2021

## 2021-08-31 ENCOUNTER — TELEPHONE (OUTPATIENT)
Dept: PHYSICAL THERAPY | Facility: HOSPITAL | Age: 51
End: 2021-08-31

## 2021-08-31 ENCOUNTER — APPOINTMENT (OUTPATIENT)
Dept: PHYSICAL THERAPY | Facility: HOSPITAL | Age: 51
End: 2021-08-31

## 2021-09-03 ENCOUNTER — TELEPHONE (OUTPATIENT)
Dept: PHYSICAL THERAPY | Facility: HOSPITAL | Age: 51
End: 2021-09-03

## 2021-09-03 NOTE — TELEPHONE ENCOUNTER
LM with pt re: no show visit in outpatient PT today. THis was her second missed visit. Reminded her of her next appointment on 9/7/2021 at 09:45. Requested she call to confirm this appointment.  If she misses one more visit, we will remove her from the schedule and allow same day appointments only.

## 2021-09-07 ENCOUNTER — TELEPHONE (OUTPATIENT)
Dept: PHYSICAL THERAPY | Facility: HOSPITAL | Age: 51
End: 2021-09-07

## 2021-09-07 NOTE — TELEPHONE ENCOUNTER
LM with pt re: no show visit in outpatient PT today. This was her third missed visit. Informed patient of no show/cancellation policy and all of her remaining visits will be removed from the schedule. Provided patient will front office number and to call with any concerns.

## 2022-03-01 ENCOUNTER — OFFICE VISIT (OUTPATIENT)
Dept: ORTHOPEDIC SURGERY | Facility: CLINIC | Age: 52
End: 2022-03-01

## 2022-03-01 VITALS — WEIGHT: 180 LBS | TEMPERATURE: 96.2 F | HEIGHT: 63 IN | BODY MASS INDEX: 31.89 KG/M2

## 2022-03-01 DIAGNOSIS — M25.562 LEFT KNEE PAIN, UNSPECIFIED CHRONICITY: Primary | ICD-10-CM

## 2022-03-01 PROCEDURE — 20610 DRAIN/INJ JOINT/BURSA W/O US: CPT | Performed by: NURSE PRACTITIONER

## 2022-03-01 PROCEDURE — 73562 X-RAY EXAM OF KNEE 3: CPT | Performed by: NURSE PRACTITIONER

## 2022-03-01 PROCEDURE — 99214 OFFICE O/P EST MOD 30 MIN: CPT | Performed by: NURSE PRACTITIONER

## 2022-03-01 RX ORDER — DESVENLAFAXINE SUCCINATE 50 MG/1
50 TABLET, EXTENDED RELEASE ORAL DAILY
COMMUNITY
Start: 2022-01-11

## 2022-03-01 RX ORDER — METHYLPREDNISOLONE ACETATE 80 MG/ML
80 INJECTION, SUSPENSION INTRA-ARTICULAR; INTRALESIONAL; INTRAMUSCULAR; SOFT TISSUE
Status: COMPLETED | OUTPATIENT
Start: 2022-03-01 | End: 2022-03-01

## 2022-03-01 RX ORDER — HYDROXYCHLOROQUINE SULFATE 200 MG/1
200 TABLET, FILM COATED ORAL DAILY
COMMUNITY
Start: 2022-01-11

## 2022-03-01 RX ORDER — LIDOCAINE HYDROCHLORIDE 20 MG/ML
2 INJECTION, SOLUTION EPIDURAL; INFILTRATION; INTRACAUDAL; PERINEURAL
Status: COMPLETED | OUTPATIENT
Start: 2022-03-01 | End: 2022-03-01

## 2022-03-01 RX ADMIN — LIDOCAINE HYDROCHLORIDE 2 ML: 20 INJECTION, SOLUTION EPIDURAL; INFILTRATION; INTRACAUDAL; PERINEURAL at 11:01

## 2022-03-01 RX ADMIN — METHYLPREDNISOLONE ACETATE 80 MG: 80 INJECTION, SUSPENSION INTRA-ARTICULAR; INTRALESIONAL; INTRAMUSCULAR; SOFT TISSUE at 11:01

## 2022-03-01 NOTE — PROGRESS NOTES
Patient: Sarika Nugent  YOB: 1970 51 y.o. female  Medical Record Number: [id number]    Chief Complaints:   Chief Complaint   Patient presents with   • Left Knee - Establish Care, Pain       History of Present Illness:Sarika Nugent is a 51 y.o. female who presents with new complaint of left knee pain.  She had previous left knee arthroscopy twice several years ago, had been doing okay, but started with increased left knee pain as well as some weakness in the left knee especially going up and down stairs, also with prolonged walking standing.  She denies any recent known injury.  She reports that the pain is definitely getting worse.    Allergies:   Allergies   Allergen Reactions   • Penicillins Swelling, Rash and Other (See Comments)     TROUBLE BREATHING . PT REPORTS CAN TAKE KEFLEX/KEFZOL WITHOUT ANY ISSUES.   • Clindamycin Swelling and Rash   • Erythromycin Rash and Other (See Comments)     INTERNAL Bleeding   • Fentanyl Itching   • Hydrocodone-Acetaminophen Itching   • Levofloxacin Swelling and Rash   • Sulfamethoxazole-Trimethoprim Swelling and Rash       Medications:   Current Outpatient Medications   Medication Sig Dispense Refill   • AIMOVIG 70 MG/ML prefilled syringe Inject 70 mg under the skin into the appropriate area as directed Every 30 (Thirty) Days. TREATMENT FOR MIGRAINES     • buPROPion XL (WELLBUTRIN XL) 150 MG 24 hr tablet Take 150 mg by mouth Every Morning.     • desvenlafaxine (PRISTIQ) 50 MG 24 hr tablet Take 50 mg by mouth Daily.     • estradiol (CLIMARA) 0.025 MG/24HR patch 1 patch 1 (One) Time Per Week.     • hydroxychloroquine (PLAQUENIL) 200 MG tablet Take 200 mg by mouth Daily.     • linaclotide (LINZESS) 145 MCG capsule capsule Take 145 mcg by mouth Daily.     • tiZANidine (ZANAFLEX) 4 MG tablet Take 4 mg by mouth Every Night.  0   • Topiramate (TOPAMAX PO) Take 200 mg by mouth Daily.     • topiramate (TOPAMAX) 200 MG tablet Take 200 mg by mouth 2 (Two) Times a  "Day.     • docusate sodium (COLACE) 100 MG capsule Take 1 capsule by mouth 2 (Two) Times a Day. 60 capsule 0   • meloxicam (Mobic) 15 MG tablet Take 1 tablet by mouth Daily. (Patient taking differently: Take 15 mg by mouth As Needed.) 30 tablet 1   • metroNIDAZOLE (FLAGYL) 250 MG tablet Take 250 mg by mouth 4 (Four) Times a Day.     • ondansetron (Zofran) 4 MG tablet Take 1 tablet by mouth Every 8 (Eight) Hours As Needed for Nausea or Vomiting. 30 tablet 0   • oxyCODONE-acetaminophen (PERCOCET) 7.5-325 MG per tablet Take 1-2 tablets by mouth Every 4 (Four) Hours As Needed (Pain). 60 tablet 0     No current facility-administered medications for this visit.         The following portions of the patient's history were reviewed and updated as appropriate: allergies, current medications, past family history, past medical history, past social history, past surgical history and problem list.    Review of Systems:   A 14 point review of systems was performed. All systems negative except pertinent positives/negative listed in HPI above    Physical Exam:   Vitals:    03/01/22 1027   Temp: 96.2 °F (35.7 °C)   Weight: 81.6 kg (180 lb)   Height: 160 cm (63\")   PainSc:   5       General: A and O x 3, ASA, NAD    SCLERA:    Normal    Skin clear no unusual lesions noted  Left knee patient does have 1+ effusion noted with 116 degrees flexion neutral in extension with a positive Matt negative Lockman calf soft and nontender       Radiology:  Xrays 3views (ap,lateral, sunrise) left knee were ordered and reviewed today secondary to pain and show medial and patellofemoral arthritic changes.  Compared to views show definite progression in arthritic changes    Assessment/Plan: Osteoarthritis left knee with increasing pain and effusion    Patient I discussed options, we will proceed with left knee aspiration, cortisone injection, physical therapy, Pennsaid gel, and the patient will see me in 3 months for bilateral knee " viscosupplementation if needed    Large Joint Arthrocentesis: L knee  Date/Time: 3/1/2022 11:01 AM  Consent given by: patient  Site marked: site marked  Timeout: Immediately prior to procedure a time out was called to verify the correct patient, procedure, equipment, support staff and site/side marked as required   Supporting Documentation  Indications: pain and joint swelling   Procedure Details  Location: knee - L knee  Preparation: Patient was prepped and draped in the usual sterile fashion  Needle size: 22 G  Approach: anterolateral  Medications administered: 80 mg methylPREDNISolone acetate 80 MG/ML; 2 mL lidocaine PF 2% 2 %  Aspirate amount: 12 mL  Aspirate: clear  Patient tolerance: patient tolerated the procedure well with no immediate complications            Chelsie Velasco, APRN  3/1/2022

## 2022-05-04 ENCOUNTER — TRANSCRIBE ORDERS (OUTPATIENT)
Dept: ADMINISTRATIVE | Facility: HOSPITAL | Age: 52
End: 2022-05-04

## 2022-05-04 DIAGNOSIS — Z12.31 BREAST CANCER SCREENING BY MAMMOGRAM: Primary | ICD-10-CM

## 2022-05-09 ENCOUNTER — HOSPITAL ENCOUNTER (OUTPATIENT)
Dept: MAMMOGRAPHY | Facility: HOSPITAL | Age: 52
Discharge: HOME | End: 2022-05-09
Admitting: NURSE PRACTITIONER

## 2022-05-09 DIAGNOSIS — Z12.31 BREAST CANCER SCREENING BY MAMMOGRAM: ICD-10-CM

## 2022-05-09 PROCEDURE — 77063 BREAST TOMOSYNTHESIS BI: CPT

## 2022-05-09 PROCEDURE — 77067 SCR MAMMO BI INCL CAD: CPT

## 2022-06-03 ENCOUNTER — TELEPHONE (OUTPATIENT)
Dept: ORTHOPEDIC SURGERY | Facility: CLINIC | Age: 52
End: 2022-06-03

## 2022-06-03 NOTE — TELEPHONE ENCOUNTER
Caller: WILLY KAUFMAN    Relationship to patient: SELF    Best call back number: [phone]    Chief complaint: KAUR KNEE    Type of visit: INJECTION    Requested date: AFTER 6.26.22    If rescheduling, when is the original appointment: 6.3.22

## 2022-06-09 ENCOUNTER — APPOINTMENT (OUTPATIENT)
Dept: MAMMOGRAPHY | Facility: HOSPITAL | Age: 52
End: 2022-06-09

## 2022-11-15 ENCOUNTER — OFFICE (AMBULATORY)
Dept: URBAN - METROPOLITAN AREA CLINIC 76 | Facility: CLINIC | Age: 52
End: 2022-11-15

## 2022-11-15 VITALS
WEIGHT: 179 LBS | HEART RATE: 85 BPM | DIASTOLIC BLOOD PRESSURE: 104 MMHG | OXYGEN SATURATION: 98 % | HEIGHT: 63 IN | SYSTOLIC BLOOD PRESSURE: 144 MMHG

## 2022-11-15 DIAGNOSIS — K59.09 OTHER CONSTIPATION: ICD-10-CM

## 2022-11-15 DIAGNOSIS — K21.00 GASTRO-ESOPHAGEAL REFLUX DISEASE WITH ESOPHAGITIS, WITHOUT B: ICD-10-CM

## 2022-11-15 DIAGNOSIS — R14.0 ABDOMINAL DISTENSION (GASEOUS): ICD-10-CM

## 2022-11-15 PROCEDURE — 99204 OFFICE O/P NEW MOD 45 MIN: CPT | Performed by: INTERNAL MEDICINE

## 2022-12-01 VITALS
RESPIRATION RATE: 20 BRPM | HEART RATE: 80 BPM | OXYGEN SATURATION: 99 % | DIASTOLIC BLOOD PRESSURE: 81 MMHG | SYSTOLIC BLOOD PRESSURE: 130 MMHG | DIASTOLIC BLOOD PRESSURE: 100 MMHG | HEART RATE: 70 BPM | RESPIRATION RATE: 16 BRPM | OXYGEN SATURATION: 98 % | HEIGHT: 63 IN | RESPIRATION RATE: 8 BRPM | WEIGHT: 179 LBS | OXYGEN SATURATION: 97 % | SYSTOLIC BLOOD PRESSURE: 117 MMHG | HEART RATE: 68 BPM | HEART RATE: 84 BPM | SYSTOLIC BLOOD PRESSURE: 148 MMHG | SYSTOLIC BLOOD PRESSURE: 132 MMHG | DIASTOLIC BLOOD PRESSURE: 93 MMHG | OXYGEN SATURATION: 96 % | DIASTOLIC BLOOD PRESSURE: 87 MMHG | OXYGEN SATURATION: 95 % | SYSTOLIC BLOOD PRESSURE: 141 MMHG | SYSTOLIC BLOOD PRESSURE: 137 MMHG | DIASTOLIC BLOOD PRESSURE: 83 MMHG | DIASTOLIC BLOOD PRESSURE: 89 MMHG | OXYGEN SATURATION: 100 % | TEMPERATURE: 98.4 F | HEART RATE: 83 BPM | DIASTOLIC BLOOD PRESSURE: 99 MMHG | SYSTOLIC BLOOD PRESSURE: 127 MMHG | SYSTOLIC BLOOD PRESSURE: 123 MMHG | HEART RATE: 79 BPM | SYSTOLIC BLOOD PRESSURE: 161 MMHG | SYSTOLIC BLOOD PRESSURE: 144 MMHG | DIASTOLIC BLOOD PRESSURE: 96 MMHG | TEMPERATURE: 97.3 F | DIASTOLIC BLOOD PRESSURE: 92 MMHG | HEART RATE: 77 BPM | HEART RATE: 57 BPM | HEART RATE: 78 BPM

## 2022-12-05 ENCOUNTER — AMBULATORY SURGICAL CENTER (AMBULATORY)
Dept: URBAN - METROPOLITAN AREA SURGERY 17 | Facility: SURGERY | Age: 52
End: 2022-12-05
Payer: MEDICARE

## 2022-12-05 ENCOUNTER — OFFICE (AMBULATORY)
Dept: URBAN - METROPOLITAN AREA PATHOLOGY 4 | Facility: PATHOLOGY | Age: 52
End: 2022-12-05
Payer: MEDICARE

## 2022-12-05 DIAGNOSIS — K62.1 RECTAL POLYP: ICD-10-CM

## 2022-12-05 DIAGNOSIS — K29.70 GASTRITIS, UNSPECIFIED, WITHOUT BLEEDING: ICD-10-CM

## 2022-12-05 DIAGNOSIS — K57.30 DIVERTICULOSIS OF LARGE INTESTINE WITHOUT PERFORATION OR ABS: ICD-10-CM

## 2022-12-05 DIAGNOSIS — B96.81 HELICOBACTER PYLORI [H. PYLORI] AS THE CAUSE OF DISEASES CLA: ICD-10-CM

## 2022-12-05 DIAGNOSIS — K59.09 OTHER CONSTIPATION: ICD-10-CM

## 2022-12-05 DIAGNOSIS — R14.0 ABDOMINAL DISTENSION (GASEOUS): ICD-10-CM

## 2022-12-05 DIAGNOSIS — K21.9 GASTRO-ESOPHAGEAL REFLUX DISEASE WITHOUT ESOPHAGITIS: ICD-10-CM

## 2022-12-05 DIAGNOSIS — K29.50 UNSPECIFIED CHRONIC GASTRITIS WITHOUT BLEEDING: ICD-10-CM

## 2022-12-05 PROBLEM — K31.89 OTHER DISEASES OF STOMACH AND DUODENUM: Status: ACTIVE | Noted: 2022-12-05

## 2022-12-05 LAB
GI HISTOLOGY: A. SELECT: (no result)
GI HISTOLOGY: B. SELECT: (no result)
GI HISTOLOGY: C. SELECT: (no result)
GI HISTOLOGY: PDF REPORT: (no result)

## 2022-12-05 PROCEDURE — 43239 EGD BIOPSY SINGLE/MULTIPLE: CPT | Performed by: INTERNAL MEDICINE

## 2022-12-05 PROCEDURE — 45380 COLONOSCOPY AND BIOPSY: CPT | Performed by: INTERNAL MEDICINE

## 2022-12-05 PROCEDURE — 88305 TISSUE EXAM BY PATHOLOGIST: CPT | Performed by: INTERNAL MEDICINE

## 2022-12-05 RX ADMIN — SIMETHICONE: 20 EMULSION ORAL at 08:54

## 2022-12-05 NOTE — SERVICEHPINOTES
CARINA RITCHIE  is a  51  female   who presents today for a  EGD-Colonoscopy   for   the indications listed below. The updated Patient Profile was reviewed prior to the procedure, in conjunction with the Physical Exam, including medical conditions, surgical procedures, medications, allergies, family history and social history. See Physical Exam time stamp below for date and time of HPI completion.Pre-operatively, I reviewed the indication(s) for the procedure, the risks of the procedure [including but not limited to: unexpected bleeding possibly requiring hospitalization and/or unplanned repeat procedures, perforation possibly requiring surgical treatment, missed lesions and complications of sedation/MAC (also explained by anesthesia staff)]. I have evaluated the patient for risks associated with the planned anesthesia and the procedure to be performed and find the patient an acceptable candidate for IV sedation.Multiple opportunities were provided for any questions or concerns, and all questions were answered satisfactorily before any anesthesia was administered. We will proceed with the planned procedure.br

## 2023-04-27 ENCOUNTER — OFFICE VISIT (OUTPATIENT)
Dept: ORTHOPEDIC SURGERY | Facility: CLINIC | Age: 53
End: 2023-04-27
Payer: MEDICARE

## 2023-04-27 VITALS — WEIGHT: 179 LBS | TEMPERATURE: 97.9 F | BODY MASS INDEX: 31.71 KG/M2 | HEIGHT: 63 IN

## 2023-04-27 DIAGNOSIS — R52 PAIN: Primary | ICD-10-CM

## 2023-04-27 DIAGNOSIS — M17.0 PRIMARY OSTEOARTHRITIS OF BOTH KNEES: ICD-10-CM

## 2023-04-27 RX ORDER — MELOXICAM 15 MG/1
15 TABLET ORAL DAILY
Qty: 30 TABLET | Refills: 3 | Status: SHIPPED | OUTPATIENT
Start: 2023-04-27

## 2023-04-27 NOTE — PROGRESS NOTES
Patient: Sarika Nugent  YOB: 1970 52 y.o. female  Medical Record Number: [id number]    Chief Complaints:   Chief Complaint   Patient presents with   • Left Knee - Initial Evaluation   • Right Knee - Initial Evaluation       History of Present Illness:Sarika Nugent is a 52 y.o. female who presents with complaints of increased bilateral knee pain.  Patient reports she has had pain off and on for years but went on a hiking trip which she labs to do a couple months ago and has had increased bilateral anterior knee pain since.  Patient reports that the pain is worse going up and down stairs also with hiking.  She has not had any recent physical therapy or injections, she takes ibuprofen as needed but does not take that on a regular basis.  She otherwise denies any recent injury    Allergies:   Allergies   Allergen Reactions   • Penicillins Swelling, Rash and Other (See Comments)     TROUBLE BREATHING . PT REPORTS CAN TAKE KEFLEX/KEFZOL WITHOUT ANY ISSUES.   • Clindamycin Swelling and Rash   • Erythromycin Rash and Other (See Comments)     INTERNAL Bleeding   • Fentanyl Itching   • Hydrocodone-Acetaminophen Itching   • Levofloxacin Swelling and Rash   • Sulfamethoxazole-Trimethoprim Swelling and Rash       Medications:   Current Outpatient Medications   Medication Sig Dispense Refill   • AIMOVIG 70 MG/ML prefilled syringe Inject 1 mL under the skin into the appropriate area as directed Every 30 (Thirty) Days. TREATMENT FOR MIGRAINES     • buPROPion XL (WELLBUTRIN XL) 150 MG 24 hr tablet Take 1 tablet by mouth Every Morning.     • desvenlafaxine (PRISTIQ) 50 MG 24 hr tablet Take 1 tablet by mouth Daily.     • estradiol (CLIMARA) 0.025 MG/24HR patch 1 patch 1 (One) Time Per Week.     • hydroxychloroquine (PLAQUENIL) 200 MG tablet Take 1 tablet by mouth Daily.     • linaclotide (LINZESS) 145 MCG capsule capsule Take 1 capsule by mouth Daily.     • tiZANidine (ZANAFLEX) 4 MG tablet Take 1 tablet by  "mouth Every Night.  0   • Topiramate (TOPAMAX PO) Take 200 mg by mouth Daily.     • topiramate (TOPAMAX) 200 MG tablet Take 1 tablet by mouth 2 (Two) Times a Day.     • docusate sodium (COLACE) 100 MG capsule Take 1 capsule by mouth 2 (Two) Times a Day. 60 capsule 0   • meloxicam (Mobic) 15 MG tablet Take 1 tablet by mouth Daily. (Patient taking differently: Take 15 mg by mouth As Needed.) 30 tablet 1   • meloxicam (Mobic) 15 MG tablet Take 1 tablet by mouth Daily. 30 tablet 3   • metroNIDAZOLE (FLAGYL) 250 MG tablet Take 250 mg by mouth 4 (Four) Times a Day.     • ondansetron (Zofran) 4 MG tablet Take 1 tablet by mouth Every 8 (Eight) Hours As Needed for Nausea or Vomiting. 30 tablet 0   • oxyCODONE-acetaminophen (PERCOCET) 7.5-325 MG per tablet Take 1-2 tablets by mouth Every 4 (Four) Hours As Needed (Pain). 60 tablet 0     No current facility-administered medications for this visit.         The following portions of the patient's history were reviewed and updated as appropriate: allergies, current medications, past family history, past medical history, past social history, past surgical history and problem list.    Review of Systems:   14 point review of systems was performed. All systems negative except pertinent positives/negatives listed in HPI above    Physical Exam:   Vitals:    04/27/23 1044   Temp: 97.9 °F (36.6 °C)   TempSrc: Temporal   Weight: 81.2 kg (179 lb)   Height: 160 cm (62.99\")   PainSc:   2   PainLoc: Knee       General: A and O x 3, ASA, NAD   Skin clear no unusual lesions noted  Bilateral knees patient has no appreciable effusion 116 degrees flexion neutral in extension with significant patellofemoral crepitus noted.  Negative Lachman negative Matt calf soft and nontender       Radiology:  Xrays 3views (ap,lateral, sunrise) bilateral knees were ordered and reviewed today secondary to increased pain and show significant patellofemoral osteoarthritic changes noted bilaterally.  Compared to " views are unchanged    Assessment/Plan: Osteoarthritis bilateral patellofemoral compartments with increased pain    Patient and I discussed options, she would like to hold off on physical therapy, instead we will try meloxicam as needed instead of over-the-counter anti-inflammatories, patient will return the office in 2 weeks for viscosupplementation if approved with insurance.      Chelsie Velasco, APRN  4/27/2023  Answers for HPI/ROS submitted by the patient on 4/27/2023  Please describe your symptoms.: Pain in both knees  Have you had these symptoms before?: Yes  How long have you been having these symptoms?: Greater than 2 weeks  Please describe any probable cause for these symptoms. : Pain during walking or sitting for long periods  What is the primary reason for your visit?: Other

## 2023-05-11 ENCOUNTER — OFFICE VISIT (OUTPATIENT)
Dept: ORTHOPEDIC SURGERY | Facility: CLINIC | Age: 53
End: 2023-05-11
Payer: MEDICARE

## 2023-05-11 VITALS — HEIGHT: 63 IN | BODY MASS INDEX: 31.18 KG/M2 | WEIGHT: 176 LBS | TEMPERATURE: 98.9 F

## 2023-05-11 DIAGNOSIS — M17.0 PRIMARY OSTEOARTHRITIS OF BOTH KNEES: Primary | ICD-10-CM

## 2023-05-11 RX ORDER — LIDOCAINE HYDROCHLORIDE 10 MG/ML
2 INJECTION, SOLUTION EPIDURAL; INFILTRATION; INTRACAUDAL; PERINEURAL
Status: COMPLETED | OUTPATIENT
Start: 2023-05-11 | End: 2023-05-11

## 2023-05-11 RX ADMIN — LIDOCAINE HYDROCHLORIDE 2 ML: 10 INJECTION, SOLUTION EPIDURAL; INFILTRATION; INTRACAUDAL; PERINEURAL at 09:53

## 2023-05-11 RX ADMIN — LIDOCAINE HYDROCHLORIDE 2 ML: 10 INJECTION, SOLUTION EPIDURAL; INFILTRATION; INTRACAUDAL; PERINEURAL at 09:54

## 2023-05-11 NOTE — PROGRESS NOTES
5/11/2023    Sarika Nugent is here today for worsening knee pain. Pt has undergone injection of the knee in the past with good resolution of symptoms. Pt is requesting a repeat injection.     KNEE Injection Procedure Note:    Large Joint Arthrocentesis: R knee  Date/Time: 5/11/2023 9:53 AM  Consent given by: patient  Site marked: site marked  Timeout: Immediately prior to procedure a time out was called to verify the correct patient, procedure, equipment, support staff and site/side marked as required   Supporting Documentation  Indications: pain   Procedure Details  Location: knee - R knee  Preparation: Patient was prepped and draped in the usual sterile fashion  Needle gauge: 21.  Approach: anterolateral  Medications administered: 48 mg hylan 48 MG/6ML; 2 mL lidocaine PF 1% 1 %  Patient tolerance: patient tolerated the procedure well with no immediate complications    Large Joint Arthrocentesis: L knee  Date/Time: 5/11/2023 9:54 AM  Consent given by: patient  Site marked: site marked  Timeout: Immediately prior to procedure a time out was called to verify the correct patient, procedure, equipment, support staff and site/side marked as required   Supporting Documentation  Indications: pain   Procedure Details  Location: knee - L knee  Preparation: Patient was prepped and draped in the usual sterile fashion  Needle gauge: 21.  Approach: anterolateral  Medications administered: 48 mg hylan 48 MG/6ML; 2 mL lidocaine PF 1% 1 %  Patient tolerance: patient tolerated the procedure well with no immediate complications          At the conclusion of the injection I discussed the importance of continued quad strengthening exercises on a daily basis. I will see the patient back if the symptoms should fail to improve or worsen.    Chelsie Velasco, APRN

## 2023-08-04 ENCOUNTER — HOSPITAL ENCOUNTER (OUTPATIENT)
Dept: MAMMOGRAPHY | Facility: HOSPITAL | Age: 53
Discharge: HOME | End: 2023-08-04
Admitting: NURSE PRACTITIONER
Payer: MEDICARE

## 2023-08-04 DIAGNOSIS — Z12.31 SCREENING MAMMOGRAM, ENCOUNTER FOR: ICD-10-CM

## 2023-08-04 PROCEDURE — 77067 SCR MAMMO BI INCL CAD: CPT

## 2023-08-04 PROCEDURE — 77063 BREAST TOMOSYNTHESIS BI: CPT

## 2023-08-23 DIAGNOSIS — M17.0 PRIMARY OSTEOARTHRITIS OF BOTH KNEES: Primary | ICD-10-CM

## 2023-08-24 RX ORDER — MELOXICAM 15 MG/1
TABLET ORAL
Qty: 30 TABLET | Refills: 0 | Status: SHIPPED | OUTPATIENT
Start: 2023-08-24

## 2023-09-27 DIAGNOSIS — M17.0 PRIMARY OSTEOARTHRITIS OF BOTH KNEES: ICD-10-CM

## 2023-09-28 RX ORDER — MELOXICAM 15 MG/1
TABLET ORAL
Qty: 30 TABLET | Refills: 0 | Status: SHIPPED | OUTPATIENT
Start: 2023-09-28

## 2023-11-10 DIAGNOSIS — M17.0 PRIMARY OSTEOARTHRITIS OF BOTH KNEES: ICD-10-CM

## 2023-11-10 RX ORDER — MELOXICAM 15 MG/1
TABLET ORAL
Qty: 30 TABLET | Refills: 0 | Status: SHIPPED | OUTPATIENT
Start: 2023-11-10

## 2024-01-26 ENCOUNTER — HOSPITAL ENCOUNTER (OUTPATIENT)
Dept: GENERAL RADIOLOGY | Facility: HOSPITAL | Age: 54
Discharge: HOME | End: 2024-01-26
Payer: MEDICARE

## 2024-01-26 ENCOUNTER — TRANSCRIBE ORDERS (OUTPATIENT)
Dept: ADMINISTRATIVE | Facility: HOSPITAL | Age: 54
End: 2024-01-26
Payer: MEDICARE

## 2024-01-26 DIAGNOSIS — M25.562 BILATERAL CHRONIC KNEE PAIN: Primary | ICD-10-CM

## 2024-01-26 DIAGNOSIS — G89.29 BILATERAL CHRONIC KNEE PAIN: Primary | ICD-10-CM

## 2024-01-26 DIAGNOSIS — M25.561 BILATERAL CHRONIC KNEE PAIN: Primary | ICD-10-CM

## 2024-01-26 DIAGNOSIS — M25.561 BILATERAL CHRONIC KNEE PAIN: ICD-10-CM

## 2024-01-26 DIAGNOSIS — M25.562 BILATERAL CHRONIC KNEE PAIN: ICD-10-CM

## 2024-01-26 DIAGNOSIS — G89.29 BILATERAL CHRONIC KNEE PAIN: ICD-10-CM

## 2024-01-26 PROCEDURE — 73562 X-RAY EXAM OF KNEE 3: CPT

## 2024-02-07 DIAGNOSIS — M17.0 PRIMARY OSTEOARTHRITIS OF BOTH KNEES: ICD-10-CM

## 2024-02-08 RX ORDER — MELOXICAM 15 MG/1
TABLET ORAL
Qty: 30 TABLET | Refills: 0 | Status: SHIPPED | OUTPATIENT
Start: 2024-02-08

## 2024-05-22 ENCOUNTER — TRANSCRIBE ORDERS (OUTPATIENT)
Dept: ADMINISTRATIVE | Facility: HOSPITAL | Age: 54
End: 2024-05-22
Payer: MEDICARE

## 2024-05-22 DIAGNOSIS — R10.2 PELVIC PAIN SYNDROME: ICD-10-CM

## 2024-05-22 DIAGNOSIS — N95.0 POSTMENOPAUSAL BLEEDING: Primary | ICD-10-CM

## (undated) DEVICE — TBG PUMP ARTHSCP MAIN AR6400 16FT

## (undated) DEVICE — PK ARTHSCP SHLDR TOWER 40

## (undated) DEVICE — APPL CHLORAPREP HI/LITE 26ML ORNG

## (undated) DEVICE — ABL ASP APOLLO RF XL 90D

## (undated) DEVICE — 3M™ IOBAN™ 2 ANTIMICROBIAL INCISE DRAPE 6640EZ: Brand: IOBAN™ 2

## (undated) DEVICE — GLV SURG BIOGEL LTX PF 8

## (undated) DEVICE — POSTN ARMSLV LAT/TRACTION DISP

## (undated) DEVICE — SKIN PREP TRAY W/CHG: Brand: MEDLINE INDUSTRIES, INC.

## (undated) DEVICE — SUT ETHLN 4/0 PS2 PLSTC 1667G

## (undated) DEVICE — GAUZE,SPONGE,FLUFF,6"X6.75",STRL,10/TRAY: Brand: MEDLINE

## (undated) DEVICE — BLD SHAVER TORPEDO COOLCUT 4.0MM 13CM

## (undated) DEVICE — TUBING, SUCTION, 1/4" X 20', STRAIGHT: Brand: MEDLINE INDUSTRIES, INC.

## (undated) DEVICE — PAD,ABDOMINAL,8"X10",ST,LF: Brand: MEDLINE

## (undated) DEVICE — DISPOSABLE TOURNIQUET CUFF SINGLE BLADDER, SINGLE PORT AND QUICK CONNECT CONNECTOR: Brand: COLOR CUFF

## (undated) DEVICE — DRAPE,REIN 53X77,STERILE: Brand: MEDLINE

## (undated) DEVICE — GLV SURG SIGNATURE ESSENTIAL PF LTX SZ8.5

## (undated) DEVICE — GLV SURG SIGNATURE ESSENTIAL PF LTX SZ8

## (undated) DEVICE — UNDERCAST PADDING: Brand: DEROYAL

## (undated) DEVICE — BNDG ESMARK STRL 6INX12FT LF

## (undated) DEVICE — PREP SOL POVIDONE/IODINE BT 4OZ

## (undated) DEVICE — REAMR PILOTED HD 7MM

## (undated) DEVICE — TUBING SET, GRAVITY, 4-SPIKE

## (undated) DEVICE — DRSNG GZ PETROLTM XEROFORM CURAD 1X8IN STRL

## (undated) DEVICE — SUT ETHLN 3/0 PS1 18IN 1663H

## (undated) DEVICE — EMERALD ARTHROSCOPY SHEET: Brand: CONVERTORS

## (undated) DEVICE — DRAPE,U/ SHT,SPLIT,PLAS,STERIL: Brand: MEDLINE

## (undated) DEVICE — GOWN,NON-REINFORCED,SIRUS,SET IN SLV,XXL: Brand: MEDLINE

## (undated) DEVICE — PK ARTHSCP 40

## (undated) DEVICE — GLV SURG BIOGEL LTX PF 7

## (undated) DEVICE — SOL ISO/ALC RUB 70PCT 4OZ

## (undated) DEVICE — BLD SHAVER BONECUTTER 4MM 13CM

## (undated) DEVICE — BNDG ELAS ELITE V/CLOSE 4IN 5YD LF STRL

## (undated) DEVICE — GLV SURG SIGNATURE ESSENTIAL PF LTX SZ7

## (undated) DEVICE — DRSNG WND GZ CURAD OIL EMULSION 3X3IN STRL

## (undated) DEVICE — BLD DISSCT COOL CUT SJ CRVD 4MM 13CM

## (undated) DEVICE — CANN TRPL DAM 7X7MM NO VLV

## (undated) DEVICE — GLV SURG BIOGEL LTX PF 6 1/2

## (undated) DEVICE — GLV SURG BIOGEL LTX PF 8 1/2